# Patient Record
Sex: FEMALE | Race: WHITE | Employment: FULL TIME | URBAN - NONMETROPOLITAN AREA
[De-identification: names, ages, dates, MRNs, and addresses within clinical notes are randomized per-mention and may not be internally consistent; named-entity substitution may affect disease eponyms.]

---

## 2020-10-05 ENCOUNTER — HOSPITAL ENCOUNTER (EMERGENCY)
Age: 35
Discharge: HOME | End: 2020-10-05
Attending: EMERGENCY MEDICINE
Payer: MEDICAID

## 2020-10-05 ENCOUNTER — APPOINTMENT (OUTPATIENT)
Dept: GENERAL RADIOLOGY | Age: 35
End: 2020-10-05
Payer: MEDICAID

## 2020-10-05 VITALS
BODY MASS INDEX: 27.99 KG/M2 | OXYGEN SATURATION: 99 % | TEMPERATURE: 98.7 F | WEIGHT: 158 LBS | RESPIRATION RATE: 18 BRPM | HEART RATE: 116 BPM | SYSTOLIC BLOOD PRESSURE: 137 MMHG | DIASTOLIC BLOOD PRESSURE: 96 MMHG

## 2020-10-05 PROCEDURE — 73630 X-RAY EXAM OF FOOT: CPT

## 2020-10-05 PROCEDURE — 73610 X-RAY EXAM OF ANKLE: CPT

## 2020-10-05 PROCEDURE — 99283 EMERGENCY DEPT VISIT LOW MDM: CPT

## 2020-10-05 RX ORDER — METHYLPREDNISOLONE 4 MG/1
TABLET ORAL
Qty: 1 KIT | Refills: 0 | Status: SHIPPED | OUTPATIENT
Start: 2020-10-05 | End: 2020-12-08

## 2020-10-05 RX ORDER — OXCARBAZEPINE 300 MG/1
900 TABLET, FILM COATED ORAL DAILY
COMMUNITY
End: 2020-12-08 | Stop reason: SDUPTHER

## 2020-10-05 RX ORDER — NORTRIPTYLINE HYDROCHLORIDE 50 MG/1
25 CAPSULE ORAL NIGHTLY
COMMUNITY
End: 2020-12-08

## 2020-10-05 RX ORDER — HYDROXYZINE PAMOATE 50 MG/1
50 CAPSULE ORAL 3 TIMES DAILY PRN
COMMUNITY
End: 2020-12-08 | Stop reason: SDUPTHER

## 2020-10-05 RX ORDER — CEFDINIR 300 MG/1
300 CAPSULE ORAL 2 TIMES DAILY
Qty: 20 CAPSULE | Refills: 0 | Status: SHIPPED | OUTPATIENT
Start: 2020-10-05 | End: 2020-10-09

## 2020-10-05 ASSESSMENT — ENCOUNTER SYMPTOMS
NAUSEA: 0
SORE THROAT: 0
EYE DISCHARGE: 0
DIARRHEA: 0
ABDOMINAL PAIN: 0
BACK PAIN: 0
VOMITING: 0
COUGH: 0

## 2020-10-05 ASSESSMENT — PAIN DESCRIPTION - DESCRIPTORS: DESCRIPTORS: ACHING;CONSTANT

## 2020-10-05 ASSESSMENT — PAIN DESCRIPTION - FREQUENCY: FREQUENCY: CONTINUOUS

## 2020-10-05 ASSESSMENT — PAIN DESCRIPTION - PAIN TYPE: TYPE: ACUTE PAIN

## 2020-10-05 ASSESSMENT — PAIN DESCRIPTION - LOCATION: LOCATION: ANKLE;FOOT

## 2020-10-05 ASSESSMENT — PAIN DESCRIPTION - ORIENTATION: ORIENTATION: RIGHT

## 2020-10-05 ASSESSMENT — PAIN DESCRIPTION - PROGRESSION: CLINICAL_PROGRESSION: NOT CHANGED

## 2020-10-05 ASSESSMENT — PAIN SCALES - GENERAL
PAINLEVEL_OUTOF10: 7
PAINLEVEL_OUTOF10: 6

## 2020-10-05 NOTE — ED TRIAGE NOTES
Pt states that she fell two days ago. She has swelling to right foot and redness. Pt has pain to posterior foot and ankle. Edema is pitting. Home motrin not relieving pain. Breakdown to right great toe noted. States that she dropped a can on her foot.

## 2020-10-05 NOTE — ED PROVIDER NOTES
Positive for arthralgias and joint swelling. Negative for back pain and myalgias. Skin: Positive for wound. Negative for rash. Neurological: Negative for weakness and headaches. Hematological: Does not bruise/bleed easily. Psychiatric/Behavioral: Negative for confusion. Positives and Pertinent negatives as per HPI. Except as noted above in the ROS, all other systems were reviewed and negative. PAST MEDICAL HISTORY     Past Medical History:   Diagnosis Date    Hepatitis C          SURGICAL HISTORY     Past Surgical History:   Procedure Laterality Date    TUBAL LIGATION           CURRENTMEDICATIONS       Discharge Medication List as of 10/5/2020  4:11 PM      CONTINUE these medications which have NOT CHANGED    Details   hydrOXYzine (VISTARIL) 50 MG capsule Take 50 mg by mouth 3 times daily as needed for ItchingHistorical Med      OXcarbazepine (TRILEPTAL) 300 MG tablet Take 900 mg by mouth dailyHistorical Med      nortriptyline (PAMELOR) 25 MG capsule Take 25 mg by mouth nightlyHistorical Med               ALLERGIES     Latex; Adhesive tape; and Zithromax [azithromycin]    FAMILYHISTORY     History reviewed. No pertinent family history.        SOCIAL HISTORY       Social History     Socioeconomic History    Marital status: Single     Spouse name: None    Number of children: None    Years of education: None    Highest education level: None   Occupational History    None   Social Needs    Financial resource strain: None    Food insecurity     Worry: None     Inability: None    Transportation needs     Medical: None     Non-medical: None   Tobacco Use    Smoking status: Current Every Day Smoker     Packs/day: 0.50     Years: 10.00     Pack years: 5.00     Types: Cigarettes    Smokeless tobacco: Never Used   Substance and Sexual Activity    Alcohol use: No    Drug use: No    Sexual activity: Yes     Partners: Male   Lifestyle    Physical activity     Days per week: None     Minutes per session: None    Stress: None   Relationships    Social connections     Talks on phone: None     Gets together: None     Attends Restorationist service: None     Active member of club or organization: None     Attends meetings of clubs or organizations: None     Relationship status: None    Intimate partner violence     Fear of current or ex partner: None     Emotionally abused: None     Physically abused: None     Forced sexual activity: None   Other Topics Concern    None   Social History Narrative    None       SCREENINGS             PHYSICAL EXAM    (up to 7 for level 4, 8 or more for level 5)     ED Triage Vitals [10/05/20 1448]   BP Temp Temp Source Pulse Resp SpO2 Height Weight   -- 98.7 °F (37.1 °C) Oral 125 18 -- -- 158 lb (71.7 kg)       Physical Exam  Vitals signs and nursing note reviewed. Constitutional:       General: She is not in acute distress. Appearance: She is well-developed. Comments: Appears anxious. HENT:      Head: Normocephalic and atraumatic. Right Ear: External ear normal.      Left Ear: External ear normal.      Nose: Nose normal.      Mouth/Throat:      Pharynx: No oropharyngeal exudate. Eyes:      Conjunctiva/sclera: Conjunctivae normal.      Pupils: Pupils are equal, round, and reactive to light. Neck:      Musculoskeletal: Normal range of motion and neck supple. Cardiovascular:      Rate and Rhythm: Regular rhythm. Tachycardia present. Heart sounds: Normal heart sounds. No murmur. No friction rub. No gallop. Pulmonary:      Effort: Pulmonary effort is normal. No respiratory distress. Breath sounds: Normal breath sounds. No wheezing. Abdominal:      General: Bowel sounds are normal. There is no distension. Palpations: Abdomen is soft. Tenderness: There is no abdominal tenderness. There is no guarding or rebound.    Musculoskeletal:      Right hip: Normal.      Right knee: Normal.      Right ankle: She exhibits decreased range of motion, swelling and ecchymosis. She exhibits no deformity, no laceration and normal pulse. Tenderness. Lateral malleolus, AITFL and CF ligament tenderness found. No medial malleolus, no posterior TFL, no head of 5th metatarsal and no proximal fibula tenderness found. Achilles tendon exhibits pain. Achilles tendon exhibits no defect and normal Landeros's test results. Lumbar back: She exhibits tenderness and pain. She exhibits normal range of motion, no bony tenderness, no swelling, no edema, no deformity, no laceration and no spasm. Back:         Feet:    Lymphadenopathy:      Cervical: No cervical adenopathy. Skin:     General: Skin is warm and dry. Findings: No rash. Neurological:      Mental Status: She is alert and oriented to person, place, and time. Cranial Nerves: No cranial nerve deficit. DIAGNOSTIC RESULTS   LABS:    No results found for this visit on 10/05/20. All other labs were within normal range ornot returned as of this dictation. EKG: All EKG's are interpreted by the Emergency Department Physician who either signs or Co-signs this chart in the absence of a cardiologist.  Please see their note for interpretation of EKG. RADIOLOGY:   Non-plain film images such as CT, Ultrasound and MRI are read by the radiologist.  Plain radiographic images are visualized and preliminarily interpreted by the ED Provider with the belowfindings:    Interpretation per the Radiologist below, if available at the time of this note:    XR FOOT RIGHT (MIN 3 VIEWS)   Final Result   1. Acute minimally displaced oblique intra-articular fracture of the distal   right fibula with overlying soft tissue swelling. 2. No acute fracture or dislocation of the right foot. XR ANKLE RIGHT (MIN 3 VIEWS)   Final Result   1. Acute minimally displaced oblique intra-articular fracture of the distal   right fibula with overlying soft tissue swelling.    2. No acute fracture or dislocation of the right

## 2020-10-05 NOTE — DISCHARGE INSTR - COC
Continuity of Care Form    Patient Name: Phoebe Machuca   :  1985  MRN:  [id number]    Admit date:  10/5/2020  Discharge date:  ***    Code Status Order: No Order   Advance Directives:     Admitting Physician:  No admitting provider for patient encounter. PCP: No primary care provider on file. Discharging Nurse: [facility] Unit/Room#:   Discharging Unit Phone Number: ***    Emergency Contact:   Extended Emergency Contact Information  Primary Emergency Contact: Gume Abdelrahman [address], [facility] Phone: [phone]  Work Phone: [phone]  Mobile Phone: [phone]  Relation: Other    Past Surgical History:  Past Surgical History:   Procedure Laterality Date    TUBAL LIGATION         Immunization History: There is no immunization history on file for this patient. Active Problems: There is no problem list on file for this patient.       Isolation/Infection:   Isolation          No Isolation        Patient Infection Status     None to display          Nurse Assessment:  Last Vital Signs: BP (!) 137/96   Pulse 116   Temp 98.7 °F (37.1 °C) (Oral)   Resp 18   Wt 158 lb (71.7 kg)   SpO2 99%   BMI 27.99 kg/m²     Last documented pain score (0-10 scale): Pain Level: 7  Last Weight:   Wt Readings from Last 1 Encounters:   10/05/20 158 lb (71.7 kg)     Mental Status:  {IP PT MENTAL STATUS:62562}    IV Access:  { EDUARD IV ACCESS:810162169}    Nursing Mobility/ADLs:  Walking   {[facility] DME KVZN:167650785}  Transfer  {[facility] DME SHSP:472467967}  Bathing  {[facility] DME NCWR:071198993}  Dressing  {P DME [facility]:915431026}  Toileting  {[facility] DME GATX:435952938}  Feeding  {[facility] DME MWBR:657264975}  Med Admin  {[facility] DME OSGB:661506243}  Med Delivery   { EDUARD MED Delivery:326731731}    Wound Care Documentation and Therapy:        Elimination:  Continence:   · Bowel: {YES / ZH:83618}  · Bladder: {YES / NK:26596}  Urinary Catheter: {Urinary Catheter:872978134}   Colostomy/Ileostomy/Ileal Conduit: {YES / WO:07440} Date of Last BM: ***  No intake or output data in the 24 hours ending 10/05/20 1622  No intake/output data recorded.     Safety Concerns:     [address] [facility] Safety Concerns:119795329}    Impairments/Disabilities:      [address] [facility] Impairments/Disabilities:179448512}    Nutrition Therapy:  Current Nutrition Therapy:   [address] [place] [address] Diet List:599911312}    Routes of Feeding: {CHP DME Other Feedings:486981382}  Liquids: {Slp liquid thickness:49579}  Daily Fluid Restriction: {CHP DME Yes amt example:053992052}  Last Modified Barium Swallow with Video (Video Swallowing Test): {Done Not Done XOQS:442181489}    Treatments at the Time of Hospital Discharge:   Respiratory Treatments: ***  Oxygen Therapy:  {Therapy; copd oxygen:81184}  Ventilator:    {MH CC Vent YMDQ:566816736}    Rehab Therapies: {THERAPEUTIC INTERVENTION:9808546633}  Weight Bearing Status/Restrictions: [address] [place] [address] Weight Bearin}  Other Medical Equipment (for information only, NOT a DME order):  {EQUIPMENT:662604356}  Other Treatments: ***    Patient's personal belongings (please select all that are sent with patient):  {[facility] DME Belongings:065904601}    RN SIGNATURE:  {Esignature:411127404}    CASE MANAGEMENT/SOCIAL WORK SECTION    Inpatient Status Date: ***    Readmission Risk Assessment Score:  Readmission Risk              Risk of Unplanned Readmission:        0           Discharging to Facility/ Agency   · Name:   · Address:  · Phone:  · Fax:    Dialysis Facility (if applicable)   · Name:  · Address:  · Dialysis Schedule:  · Phone:  · Fax:    / signature: {Esignature:600839828}    PHYSICIAN SECTION    Prognosis: {Prognosis:9233073093}    Condition at Discharge: [address] Patient Condition:244745041}    Rehab Potential (if transferring to Rehab): {Prognosis:8809273294}    Recommended Labs or Other Treatments After Discharge: ***    Physician Certification: I certify the above information and transfer of Dandy Cherry  is necessary for the continuing treatment of the diagnosis listed and that she requires {Admit to Appropriate Level of Care:11569} for {GREATER/LESS:165538443} 30 days.      Update Admission H&P: {CHP DME Changes in [facility]:037828572}    PHYSICIAN SIGNATURE:  {Esignature:764542134}

## 2020-10-07 PROBLEM — S82.831A CLOSED FRACTURE OF RIGHT DISTAL FIBULA: Status: ACTIVE | Noted: 2020-10-07

## 2020-10-08 ENCOUNTER — TELEPHONE (OUTPATIENT)
Dept: ORTHOPEDIC SURGERY | Age: 35
End: 2020-10-08

## 2020-10-08 ENCOUNTER — HOSPITAL ENCOUNTER (EMERGENCY)
Age: 35
Discharge: HOME | End: 2020-10-08
Payer: MEDICAID

## 2020-10-08 NOTE — ED PROVIDER NOTES
Patient left and eloped with an AMA prior to being seen by me so there is no chance for a physical examination or note.       Dai Fountain MD  10/08/20 8484

## 2020-10-09 ENCOUNTER — OFFICE VISIT (OUTPATIENT)
Dept: ORTHOPEDIC SURGERY | Age: 35
End: 2020-10-09
Payer: MEDICAID

## 2020-10-09 VITALS — WEIGHT: 158.07 LBS | HEIGHT: 63 IN | BODY MASS INDEX: 28.01 KG/M2

## 2020-10-09 PROCEDURE — G9016 DEMO-SMOKING CESSATION COUN: HCPCS | Performed by: ORTHOPAEDIC SURGERY

## 2020-10-09 PROCEDURE — 4004F PT TOBACCO SCREEN RCVD TLK: CPT | Performed by: ORTHOPAEDIC SURGERY

## 2020-10-09 PROCEDURE — G8484 FLU IMMUNIZE NO ADMIN: HCPCS | Performed by: ORTHOPAEDIC SURGERY

## 2020-10-09 PROCEDURE — 99203 OFFICE O/P NEW LOW 30 MIN: CPT | Performed by: ORTHOPAEDIC SURGERY

## 2020-10-09 PROCEDURE — G8427 DOCREV CUR MEDS BY ELIG CLIN: HCPCS | Performed by: ORTHOPAEDIC SURGERY

## 2020-10-09 PROCEDURE — G8419 CALC BMI OUT NRM PARAM NOF/U: HCPCS | Performed by: ORTHOPAEDIC SURGERY

## 2020-10-09 NOTE — PROGRESS NOTES
Marielaemily Matthew    Age 28 y.o.    female    1985    MRN [id number]    10/12/2020  Arrival Time_____________  OR Time____________[address]     Procedure(s):  OPEN REDUCTION INTERNAL FIXATION RIGHT FIBULA FRACTURE, POSSIBLE SYNDESMOTIC LIGAMENT REPAIR -POSSIBLE BLOCK-                      General    Surgeon(s):  Ruddy Abdi, MD       Phone [phone] ([place])     [address] [place] [address] House [facility]  Cell Work  _________________________________________________________________  _________________________________________________________________  _________________________________________________________________  _________________________________________________________________  _________________________________________________________________      PCP _____________________________ Phone_________________       H&P__________________Bringing    Chart            Epic  DOS     Called_______  EKG__________________Bringing    Chart            Epic  DOS     Called_______  LAB__________________ Bringing    Chart            Epic  DOS     Called_______  Cardiac Clearance_______Bringing    Chart            Epic      DOS       Called_______    Cardiologist________________________ Phone___________________________      ? Anglican concerns / Waiver on Chart            PAT Communications_____________  ? Pre-op Instructions Given [place]          ______________________________  ? Directions to Surgery Center                          ______________________________  ? Transportation Home_______________      _______________________________  ?  Crutches/Walker__________________        _______________________________      ________Pre-op Orders   _______Transcribed    _______Wt.  ________Pharmacy          _______SCD  ______VTE     ______Beta Blocker  ________Consent             ________TED [place]

## 2020-10-09 NOTE — PROGRESS NOTES
Obstructive Sleep Apnea (KAZ) Screening     Patient:  Conrado Pathak    YOB: 1985      Medical Record #:  [id number]                     Date:  10/9/2020     1. Are you a loud and/or regular snorer? []  Yes       [x] No    2. Have you been observed to gasp or stop breathing during sleep? []  Yes       [x] No    3. Do you feel tired or groggy upon awakening or do you awaken with a headache?           []  Yes       [] No    4. Are you often tired or fatigued during the wake time hours? []  Yes       [] No    5. Do you fall asleep sitting, reading, watching TV or driving? []  Yes       [] No    6. Do you often have problems with memory or concentration? []  Yes       [] No    **If patient's score is ? 3 they are considered high risk for KAZ. An Anesthesia provider will evaluate the patient and develop a plan of care the day of surgery. Note:  If the patient's BMI is more than 35 kg m¯² , has neck circumference > 40 cm, and/or high blood pressure the risk is greater (© American Sleep Apnea Association, 2006).

## 2020-10-12 ENCOUNTER — HOSPITAL ENCOUNTER (OUTPATIENT)
Age: 35
Setting detail: OUTPATIENT SURGERY
Discharge: HOME | End: 2020-10-12
Attending: ORTHOPAEDIC SURGERY | Admitting: ORTHOPAEDIC SURGERY
Payer: MEDICAID

## 2020-10-12 ENCOUNTER — HOSPITAL ENCOUNTER (OUTPATIENT)
Dept: GENERAL RADIOLOGY | Age: 35
Discharge: HOME | End: 2020-10-12
Payer: MEDICAID

## 2020-10-12 VITALS
BODY MASS INDEX: 26.46 KG/M2 | OXYGEN SATURATION: 97 % | RESPIRATION RATE: 16 BRPM | HEIGHT: 64 IN | WEIGHT: 155 LBS | HEART RATE: 106 BPM | TEMPERATURE: 96.9 F | SYSTOLIC BLOOD PRESSURE: 121 MMHG | DIASTOLIC BLOOD PRESSURE: 81 MMHG

## 2020-10-12 LAB
PREGNANCY, URINE: NEGATIVE
SARS-COV-2, NAAT: NOT DETECTED

## 2020-10-12 PROCEDURE — U0002 COVID-19 LAB TEST NON-CDC: HCPCS

## 2020-10-12 PROCEDURE — 84703 CHORIONIC GONADOTROPIN ASSAY: CPT

## 2020-10-12 RX ORDER — SODIUM CHLORIDE 0.9 % (FLUSH) 0.9 %
10 SYRINGE (ML) INJECTION EVERY 12 HOURS SCHEDULED
Status: DISCONTINUED | OUTPATIENT
Start: 2020-10-12 | End: 2020-10-12 | Stop reason: HOSPADM

## 2020-10-12 RX ORDER — SODIUM CHLORIDE 0.9 % (FLUSH) 0.9 %
10 SYRINGE (ML) INJECTION PRN
Status: DISCONTINUED | OUTPATIENT
Start: 2020-10-12 | End: 2020-10-12 | Stop reason: HOSPADM

## 2020-10-12 RX ORDER — ZOLPIDEM TARTRATE 10 MG/1
10 TABLET ORAL NIGHTLY PRN
Qty: 15 TABLET | Refills: 0 | Status: SHIPPED | OUTPATIENT
Start: 2020-10-12 | End: 2020-10-23

## 2020-10-12 RX ORDER — LIDOCAINE HYDROCHLORIDE 10 MG/ML
1 INJECTION, SOLUTION EPIDURAL; INFILTRATION; INTRACAUDAL; PERINEURAL
Status: DISCONTINUED | OUTPATIENT
Start: 2020-10-12 | End: 2020-10-12 | Stop reason: HOSPADM

## 2020-10-12 RX ORDER — OXYCODONE HYDROCHLORIDE AND ACETAMINOPHEN 5; 325 MG/1; MG/1
1 TABLET ORAL EVERY 6 HOURS PRN
Qty: 28 TABLET | Refills: 0 | Status: SHIPPED | OUTPATIENT
Start: 2020-10-12 | End: 2020-10-19

## 2020-10-12 RX ORDER — APREPITANT 40 MG/1
40 CAPSULE ORAL ONCE
Status: DISCONTINUED | OUTPATIENT
Start: 2020-10-12 | End: 2020-10-12 | Stop reason: HOSPADM

## 2020-10-12 RX ORDER — CEPHALEXIN 500 MG/1
500 CAPSULE ORAL 4 TIMES DAILY
Qty: 12 CAPSULE | Refills: 0 | Status: SHIPPED | OUTPATIENT
Start: 2020-10-12 | End: 2020-10-23

## 2020-10-12 RX ORDER — SODIUM CHLORIDE, SODIUM LACTATE, POTASSIUM CHLORIDE, CALCIUM CHLORIDE 600; 310; 30; 20 MG/100ML; MG/100ML; MG/100ML; MG/100ML
INJECTION, SOLUTION INTRAVENOUS CONTINUOUS
Status: DISCONTINUED | OUTPATIENT
Start: 2020-10-12 | End: 2020-10-12 | Stop reason: HOSPADM

## 2020-10-12 NOTE — PROGRESS NOTES
Pt does not have H& P done, Dr. Riaz Norris informed, case is getting cancelled today so pt can get an H&P. Explained everything to pt, Dr. Micah Saha office is going to contact pt to help her get an H&P. Pt tearful. Call placed to [facility] in [place]. [place] - they have her prescriptions there as pt was saying they did not have them there. Pt informed, understanding verbalized.

## 2020-10-12 NOTE — PROGRESS NOTES
Chief Complaint    New Patient (right fibula fx)      History of Present Illness:  Lyudmila Newton is a 28 y.o. femalehere for evaluation chief complaint of right ankle pain. She states that on 10/3/2020 she slipped at home on the floor twisting her right ankle. She was found to have a right distal fibula fracture as a night isolated injury. Currently rates her pain at 8 out of 10. She was put in a boot at the hospital and states ice and rest make it better any motion makes it worse. 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 Medical History:  Patient's medications, allergies, past medical, surgical, social and family histories were reviewed and updated as appropriate. Review of Systems:  Pertinent items are noted in HPI  Review of systems reviewed from Patient History Form dated on 10/9/2020 and available in the patient's chart under the Media tab. Vital Signs:  Ht 5' 2.99\" (1.6 m)   Wt 158 lb 1.1 oz (71.7 kg)   LMP 10/03/2020   BMI 28.01 kg/m²     General Exam:   Constitutional: Patient is adequately groomed with no evidence of malnutrition  DTRs: Deep tendon reflexes are intact  Mental Status: The patient is oriented to time, place and person. The patient's mood and affect are appropriate. Lymphatic: The lymphatic examination bilaterally reveals all areas to be without enlargement or induration. Foot Examination:    Inspection: Moderate swelling and ecchymosis right ankle    Palpation: Tenderness over the lateral aspect of the ankle    Range of Motion: Within normal limits at the hip and knee    Strength: She is able to wiggle her toes and has at least 4/5 strength in the hip and knee    Special Tests: No evidence of DVT or compartment syndrome    Skin: There are no rashes, ulcerations or lesions.     Gait: Antalgic    Reflex 2+ and symmetric    Additional Comments:       Additional Examinations:         Left Lower Extremity: Examination of the left lower extremity does not show any tenderness, deformity or injury. Range of motion is unremarkable. There is no gross instability. There are no rashes, ulcerations or lesions. Strength and tone are normal.    Radiology:     X-rays obtained and reviewed in office:  Views 3  Location right ankle  Impression obtained previously shows evidence of a displaced right distal fibula fracture          Assessment : Right distal fibula fracture    Impression:  Encounter Diagnosis   Name Primary?  Preop testing Yes       Office Procedures:  Orders Placed This Encounter   Procedures    COVID-19 Ambulatory     Sx 10/13/20     Standing Status:   Future     Standing Expiration Date:   10/9/2021     Scheduling Instructions:      Saline media preferred given current shortage of viral transport media but both acceptable     Order Specific Question:   Is this test for diagnosis or screening? Answer:   Screening     Order Specific Question:   Symptomatic for COVID-19 as defined by CDC? Answer:   No     Order Specific Question:   Date of Symptom Onset     Answer:   N/A     Order Specific Question:   Hospitalized for COVID-19? Answer:   No     Order Specific Question:   Admitted to ICU for COVID-19? Answer:   No     Order Specific Question:   Employed in healthcare setting? Answer:   No     Order Specific Question:   Resident in a congregate (group) care setting? Answer:   No     Order Specific Question:   Pregnant? Answer:   No     Order Specific Question:   Previously tested for COVID-19? Answer:   Yes        Treatment Plan:  The etiology of distal fibula fracture and it's appropriate treatment were discussed in great detail. We discussed operative and nonoperative treatments. She is a smoker and smoking cessation was discussed. We covered her leg with Polysporin and put her in a Senior dressing and I gave her Percocet and Keflex she will follow-up postop.   She also requested medicine to help her sleep and I gave her Ambien we discussed the side effects of all of these medicines

## 2020-10-14 ENCOUNTER — ANESTHESIA EVENT (OUTPATIENT)
Dept: OPERATING ROOM | Age: 35
End: 2020-10-14
Payer: MEDICAID

## 2020-10-14 ENCOUNTER — TELEPHONE (OUTPATIENT)
Dept: PRIMARY CARE CLINIC | Age: 35
End: 2020-10-14

## 2020-10-14 ASSESSMENT — LIFESTYLE VARIABLES: SMOKING_STATUS: 1

## 2020-10-14 NOTE — TELEPHONE ENCOUNTER
----- Message from Jenniferdiontedanielito Collins sent at 9/28/2020 12:06 PM EDT -----  Subject: Appointment Request    Reason for Call: New Patient Request Appointment    QUESTIONS  Type of Appointment? New Patient/New to Provider  Reason for appointment request? No appointments available during search  Additional Information for Provider? Pt is requesting to establish   Ron Jerome as PCP   tested negative for COVID in the past 10 days   requesting 10/19 afternooon if possible  ---------------------------------------------------------------------------  --------------  CALL BACK INFO  What is the best way for the office to contact you? OK to leave message on   voicemail  Preferred Call Back Phone Number? [phone]  ---------------------------------------------------------------------------  --------------  SCRIPT ANSWERS  Relationship to Patient? Other  Representative Name? Ashley Moore  Additional information verified (besides Name and Date of Birth)? Address  Appointment reason? Establish Care/Find a provider  Have you been diagnosed with   tested for   or told that you are suspected of having COVID-19 (Coronavirus)? Yes  Did your symptoms begin or have you been tested for COVID-19 in the last   10 days?  Yes

## 2020-10-14 NOTE — ANESTHESIA PRE PROCEDURE
(Current):    BP: 104/62  Pulse: 112    Resp: 18  SpO2: 96    Temp: 98.6 °F (37 °C)    Height: 5' 4\" (1.626 m)  (10/15/20)  Weight: 155 lb (70.3 kg)  (10/15/20)    BMI: 26.7            BP Readings from Last 3 Encounters:   10/12/20 121/81   10/05/20 (!) 137/96     NPO Status: >8 hrs                         BMI:   Wt Readings from Last 3 Encounters:   10/14/20 155 lb (70.3 kg)   10/09/20 155 lb (70.3 kg)   10/09/20 158 lb 1.1 oz (71.7 kg)     Body mass index is 26.61 kg/m². HCG (If Applicable): negative    COVID-19 Screening (If Applicable):   Lab Results   Component Value Date    COVID19 Not Detected 10/12/2020     Anesthesia Evaluation  Patient summary reviewed and Nursing notes reviewed  Airway: Mallampati: II  TM distance: >3 FB   Neck ROM: full  Mouth opening: > = 3 FB Dental:          Pulmonary: breath sounds clear to auscultation  (+) current smoker    (-) asthma and wheezes                           Cardiovascular:  Exercise tolerance: good (>4 METS),       (-) past MI,  angina,  GILMAN and murmur      Rhythm: regular  Rate: normal                    Neuro/Psych:   (+) psychiatric history:   (-) seizures            ROS comment: +Bipolar D/O    H/O Bipolar D/O- last used IV drugs 72 hrs ago- took PO Percocet, Vistaril and Ambien prior to arrival today GI/Hepatic/Renal:   (+) hepatitis: C, liver disease:,      (-) no renal disease       Endo/Other:    (+) : arthritis:., .    (-) diabetes mellitus, hypothyroidism               Abdominal:           Vascular:     - DVT and PE. Anesthesia Plan      general     ASA 3     (PF and AC Block(s) for post-op pain management per surgeon request.)  Induction: intravenous. MIPS: Prophylactic antiemetics administered. Anesthetic plan and risks discussed with patient. Plan discussed with CRNA.             Navya Chaves MD

## 2020-10-15 ENCOUNTER — HOSPITAL ENCOUNTER (OUTPATIENT)
Age: 35
Setting detail: OUTPATIENT SURGERY
Discharge: HOME | End: 2020-10-15
Attending: ORTHOPAEDIC SURGERY | Admitting: ORTHOPAEDIC SURGERY
Payer: MEDICAID

## 2020-10-15 ENCOUNTER — ANESTHESIA (OUTPATIENT)
Dept: OPERATING ROOM | Age: 35
End: 2020-10-15
Payer: MEDICAID

## 2020-10-15 VITALS
SYSTOLIC BLOOD PRESSURE: 118 MMHG | OXYGEN SATURATION: 100 % | TEMPERATURE: 98.6 F | DIASTOLIC BLOOD PRESSURE: 55 MMHG | RESPIRATION RATE: 14 BRPM

## 2020-10-15 VITALS
RESPIRATION RATE: 15 BRPM | SYSTOLIC BLOOD PRESSURE: 131 MMHG | DIASTOLIC BLOOD PRESSURE: 85 MMHG | BODY MASS INDEX: 26.46 KG/M2 | TEMPERATURE: 97.7 F | OXYGEN SATURATION: 99 % | WEIGHT: 155 LBS | HEART RATE: 110 BPM | HEIGHT: 64 IN

## 2020-10-15 LAB — PREGNANCY, URINE: NEGATIVE

## 2020-10-15 PROCEDURE — 3700000000 HC ANESTHESIA ATTENDED CARE: Performed by: ORTHOPAEDIC SURGERY

## 2020-10-15 PROCEDURE — 2500000003 HC RX 250 WO HCPCS: Performed by: ORTHOPAEDIC SURGERY

## 2020-10-15 PROCEDURE — 2580000003 HC RX 258: Performed by: ANESTHESIOLOGY

## 2020-10-15 PROCEDURE — 6360000002 HC RX W HCPCS: Performed by: NURSE ANESTHETIST, CERTIFIED REGISTERED

## 2020-10-15 PROCEDURE — 6360000002 HC RX W HCPCS: Performed by: ORTHOPAEDIC SURGERY

## 2020-10-15 PROCEDURE — 84703 CHORIONIC GONADOTROPIN ASSAY: CPT

## 2020-10-15 PROCEDURE — 3600000004 HC SURGERY LEVEL 4 BASE: Performed by: ORTHOPAEDIC SURGERY

## 2020-10-15 PROCEDURE — 3600000014 HC SURGERY LEVEL 4 ADDTL 15MIN: Performed by: ORTHOPAEDIC SURGERY

## 2020-10-15 PROCEDURE — 7100000001 HC PACU RECOVERY - ADDTL 15 MIN: Performed by: ORTHOPAEDIC SURGERY

## 2020-10-15 PROCEDURE — C1713 ANCHOR/SCREW BN/BN,TIS/BN: HCPCS | Performed by: ORTHOPAEDIC SURGERY

## 2020-10-15 PROCEDURE — 76942 ECHO GUIDE FOR BIOPSY: CPT | Performed by: ANESTHESIOLOGY

## 2020-10-15 PROCEDURE — 7100000010 HC PHASE II RECOVERY - FIRST 15 MIN: Performed by: ORTHOPAEDIC SURGERY

## 2020-10-15 PROCEDURE — 64447 NJX AA&/STRD FEMORAL NRV IMG: CPT | Performed by: ANESTHESIOLOGY

## 2020-10-15 PROCEDURE — C1769 GUIDE WIRE: HCPCS | Performed by: ORTHOPAEDIC SURGERY

## 2020-10-15 PROCEDURE — 2580000003 HC RX 258: Performed by: ORTHOPAEDIC SURGERY

## 2020-10-15 PROCEDURE — 7100000011 HC PHASE II RECOVERY - ADDTL 15 MIN: Performed by: ORTHOPAEDIC SURGERY

## 2020-10-15 PROCEDURE — 2709999900 HC NON-CHARGEABLE SUPPLY: Performed by: ORTHOPAEDIC SURGERY

## 2020-10-15 PROCEDURE — 2500000003 HC RX 250 WO HCPCS: Performed by: ANESTHESIOLOGY

## 2020-10-15 PROCEDURE — 3700000001 HC ADD 15 MINUTES (ANESTHESIA): Performed by: ORTHOPAEDIC SURGERY

## 2020-10-15 PROCEDURE — 7100000000 HC PACU RECOVERY - FIRST 15 MIN: Performed by: ORTHOPAEDIC SURGERY

## 2020-10-15 PROCEDURE — 2500000003 HC RX 250 WO HCPCS: Performed by: NURSE ANESTHETIST, CERTIFIED REGISTERED

## 2020-10-15 DEVICE — SCREW BNE L22MM DIA3.5MM CORT FT ANK S STL NONLOCKING LO: Type: IMPLANTABLE DEVICE | Site: ANKLE | Status: FUNCTIONAL

## 2020-10-15 DEVICE — SCREW BNE L12MM DIA3.5MM CORT ANK S STL NONLOCKING LO PROF: Type: IMPLANTABLE DEVICE | Site: ANKLE | Status: FUNCTIONAL

## 2020-10-15 DEVICE — PLATE BNE 6 H R DST FIB S STL LOK FOR FRAC MGMT SYS: Type: IMPLANTABLE DEVICE | Site: ANKLE | Status: FUNCTIONAL

## 2020-10-15 DEVICE — SCREW BNE L14MM DIA3.5MM CORT ANK S STL LOK FULL THRD LO: Type: IMPLANTABLE DEVICE | Site: ANKLE | Status: FUNCTIONAL

## 2020-10-15 DEVICE — SCREW BNE L10MM DIA2.7MM ANK S STL LOK LO PROF FOR FRAC: Type: IMPLANTABLE DEVICE | Site: ANKLE | Status: FUNCTIONAL

## 2020-10-15 DEVICE — SCREW BNE L16MM DIA2.7MM ANK S STL LOK LO PROF FOR FRAC: Type: IMPLANTABLE DEVICE | Site: ANKLE | Status: FUNCTIONAL

## 2020-10-15 DEVICE — SCREW BNE L12MM DIA3.5MM CORT ANK S STL LOK FULL THRD LO: Type: IMPLANTABLE DEVICE | Site: ANKLE | Status: FUNCTIONAL

## 2020-10-15 RX ORDER — OXYCODONE HYDROCHLORIDE AND ACETAMINOPHEN 5; 325 MG/1; MG/1
1 TABLET ORAL PRN
Status: DISCONTINUED | OUTPATIENT
Start: 2020-10-15 | End: 2020-10-15 | Stop reason: HOSPADM

## 2020-10-15 RX ORDER — MIDAZOLAM HYDROCHLORIDE 1 MG/ML
INJECTION INTRAMUSCULAR; INTRAVENOUS PRN
Status: DISCONTINUED | OUTPATIENT
Start: 2020-10-15 | End: 2020-10-15 | Stop reason: SDUPTHER

## 2020-10-15 RX ORDER — OXYCODONE HYDROCHLORIDE AND ACETAMINOPHEN 5; 325 MG/1; MG/1
2 TABLET ORAL PRN
Status: DISCONTINUED | OUTPATIENT
Start: 2020-10-15 | End: 2020-10-15 | Stop reason: HOSPADM

## 2020-10-15 RX ORDER — SODIUM CHLORIDE 0.9 % (FLUSH) 0.9 %
10 SYRINGE (ML) INJECTION PRN
Status: DISCONTINUED | OUTPATIENT
Start: 2020-10-15 | End: 2020-10-15 | Stop reason: HOSPADM

## 2020-10-15 RX ORDER — PROPOFOL 10 MG/ML
INJECTION, EMULSION INTRAVENOUS PRN
Status: DISCONTINUED | OUTPATIENT
Start: 2020-10-15 | End: 2020-10-15 | Stop reason: SDUPTHER

## 2020-10-15 RX ORDER — VANCOMYCIN HYDROCHLORIDE 1 G/20ML
INJECTION, POWDER, LYOPHILIZED, FOR SOLUTION INTRAVENOUS PRN
Status: DISCONTINUED | OUTPATIENT
Start: 2020-10-15 | End: 2020-10-15 | Stop reason: ALTCHOICE

## 2020-10-15 RX ORDER — LIDOCAINE HYDROCHLORIDE 10 MG/ML
0.3 INJECTION, SOLUTION EPIDURAL; INFILTRATION; INTRACAUDAL; PERINEURAL
Status: DISCONTINUED | OUTPATIENT
Start: 2020-10-15 | End: 2020-10-15 | Stop reason: HOSPADM

## 2020-10-15 RX ORDER — LIDOCAINE HYDROCHLORIDE 20 MG/ML
INJECTION, SOLUTION EPIDURAL; INFILTRATION; INTRACAUDAL; PERINEURAL PRN
Status: DISCONTINUED | OUTPATIENT
Start: 2020-10-15 | End: 2020-10-15 | Stop reason: SDUPTHER

## 2020-10-15 RX ORDER — VANCOMYCIN HYDROCHLORIDE 500 MG/10ML
INJECTION, POWDER, LYOPHILIZED, FOR SOLUTION INTRAVENOUS
Status: DISCONTINUED
Start: 2020-10-15 | End: 2020-10-15 | Stop reason: HOSPADM

## 2020-10-15 RX ORDER — SODIUM CHLORIDE 0.9 % (FLUSH) 0.9 %
10 SYRINGE (ML) INJECTION EVERY 12 HOURS SCHEDULED
Status: DISCONTINUED | OUTPATIENT
Start: 2020-10-15 | End: 2020-10-15 | Stop reason: HOSPADM

## 2020-10-15 RX ORDER — MEPERIDINE HYDROCHLORIDE 50 MG/ML
12.5 INJECTION INTRAMUSCULAR; INTRAVENOUS; SUBCUTANEOUS EVERY 5 MIN PRN
Status: DISCONTINUED | OUTPATIENT
Start: 2020-10-15 | End: 2020-10-15 | Stop reason: HOSPADM

## 2020-10-15 RX ORDER — SODIUM CHLORIDE, SODIUM LACTATE, POTASSIUM CHLORIDE, CALCIUM CHLORIDE 600; 310; 30; 20 MG/100ML; MG/100ML; MG/100ML; MG/100ML
INJECTION, SOLUTION INTRAVENOUS CONTINUOUS
Status: DISCONTINUED | OUTPATIENT
Start: 2020-10-15 | End: 2020-10-15 | Stop reason: HOSPADM

## 2020-10-15 RX ORDER — BUPIVACAINE HYDROCHLORIDE AND EPINEPHRINE 5; 5 MG/ML; UG/ML
INJECTION, SOLUTION EPIDURAL; INTRACAUDAL; PERINEURAL PRN
Status: DISCONTINUED | OUTPATIENT
Start: 2020-10-15 | End: 2020-10-15 | Stop reason: SDUPTHER

## 2020-10-15 RX ORDER — MAGNESIUM HYDROXIDE 1200 MG/15ML
LIQUID ORAL CONTINUOUS PRN
Status: COMPLETED | OUTPATIENT
Start: 2020-10-15 | End: 2020-10-15

## 2020-10-15 RX ORDER — ONDANSETRON 2 MG/ML
4 INJECTION INTRAMUSCULAR; INTRAVENOUS
Status: DISCONTINUED | OUTPATIENT
Start: 2020-10-15 | End: 2020-10-15 | Stop reason: HOSPADM

## 2020-10-15 RX ORDER — HYDRALAZINE HYDROCHLORIDE 20 MG/ML
5 INJECTION INTRAMUSCULAR; INTRAVENOUS EVERY 10 MIN PRN
Status: DISCONTINUED | OUTPATIENT
Start: 2020-10-15 | End: 2020-10-15 | Stop reason: HOSPADM

## 2020-10-15 RX ORDER — FENTANYL CITRATE 50 UG/ML
25 INJECTION, SOLUTION INTRAMUSCULAR; INTRAVENOUS EVERY 5 MIN PRN
Status: DISCONTINUED | OUTPATIENT
Start: 2020-10-15 | End: 2020-10-15 | Stop reason: HOSPADM

## 2020-10-15 RX ORDER — DEXAMETHASONE SODIUM PHOSPHATE 10 MG/ML
INJECTION INTRAMUSCULAR; INTRAVENOUS PRN
Status: DISCONTINUED | OUTPATIENT
Start: 2020-10-15 | End: 2020-10-15 | Stop reason: SDUPTHER

## 2020-10-15 RX ORDER — ONDANSETRON 2 MG/ML
INJECTION INTRAMUSCULAR; INTRAVENOUS PRN
Status: DISCONTINUED | OUTPATIENT
Start: 2020-10-15 | End: 2020-10-15 | Stop reason: SDUPTHER

## 2020-10-15 RX ORDER — PROMETHAZINE HYDROCHLORIDE 25 MG/ML
6.25 INJECTION, SOLUTION INTRAMUSCULAR; INTRAVENOUS
Status: DISCONTINUED | OUTPATIENT
Start: 2020-10-15 | End: 2020-10-15 | Stop reason: HOSPADM

## 2020-10-15 RX ORDER — FENTANYL CITRATE 50 UG/ML
INJECTION, SOLUTION INTRAMUSCULAR; INTRAVENOUS PRN
Status: DISCONTINUED | OUTPATIENT
Start: 2020-10-15 | End: 2020-10-15 | Stop reason: SDUPTHER

## 2020-10-15 RX ADMIN — SODIUM CHLORIDE, POTASSIUM CHLORIDE, SODIUM LACTATE AND CALCIUM CHLORIDE: 600; 310; 30; 20 INJECTION, SOLUTION INTRAVENOUS at 12:40

## 2020-10-15 RX ADMIN — SODIUM CHLORIDE, POTASSIUM CHLORIDE, SODIUM LACTATE AND CALCIUM CHLORIDE: 600; 310; 30; 20 INJECTION, SOLUTION INTRAVENOUS at 13:31

## 2020-10-15 RX ADMIN — ONDANSETRON 4 MG: 2 INJECTION INTRAMUSCULAR; INTRAVENOUS at 14:35

## 2020-10-15 RX ADMIN — PROPOFOL 200 MG: 10 INJECTION, EMULSION INTRAVENOUS at 14:26

## 2020-10-15 RX ADMIN — BUPIVACAINE HYDROCHLORIDE AND EPINEPHRINE 10 ML: 5; 5 INJECTION, SOLUTION EPIDURAL; INTRACAUDAL; PERINEURAL at 13:47

## 2020-10-15 RX ADMIN — FENTANYL CITRATE 25 MCG: 50 INJECTION, SOLUTION INTRAMUSCULAR; INTRAVENOUS at 14:24

## 2020-10-15 RX ADMIN — SODIUM CHLORIDE, POTASSIUM CHLORIDE, SODIUM LACTATE AND CALCIUM CHLORIDE: 600; 310; 30; 20 INJECTION, SOLUTION INTRAVENOUS at 13:30

## 2020-10-15 RX ADMIN — Medication 900 MG: at 14:23

## 2020-10-15 RX ADMIN — SODIUM CHLORIDE, POTASSIUM CHLORIDE, SODIUM LACTATE AND CALCIUM CHLORIDE: 600; 310; 30; 20 INJECTION, SOLUTION INTRAVENOUS at 14:50

## 2020-10-15 RX ADMIN — BUPIVACAINE HYDROCHLORIDE AND EPINEPHRINE 10 ML: 5; 5 INJECTION, SOLUTION EPIDURAL; INTRACAUDAL; PERINEURAL at 13:49

## 2020-10-15 RX ADMIN — BUPIVACAINE HYDROCHLORIDE AND EPINEPHRINE 10 ML: 5; 5 INJECTION, SOLUTION EPIDURAL; INTRACAUDAL; PERINEURAL at 13:45

## 2020-10-15 RX ADMIN — FENTANYL CITRATE 25 MCG: 50 INJECTION, SOLUTION INTRAMUSCULAR; INTRAVENOUS at 14:26

## 2020-10-15 RX ADMIN — LIDOCAINE HYDROCHLORIDE 3 MG: 20 INJECTION, SOLUTION EPIDURAL; INFILTRATION; INTRACAUDAL; PERINEURAL at 14:26

## 2020-10-15 RX ADMIN — BUPIVACAINE HYDROCHLORIDE AND EPINEPHRINE 10 ML: 5; 5 INJECTION, SOLUTION EPIDURAL; INTRACAUDAL; PERINEURAL at 13:48

## 2020-10-15 RX ADMIN — MIDAZOLAM HYDROCHLORIDE 1 MG: 2 INJECTION, SOLUTION INTRAMUSCULAR; INTRAVENOUS at 14:22

## 2020-10-15 RX ADMIN — BUPIVACAINE HYDROCHLORIDE AND EPINEPHRINE 10 ML: 5; 5 INJECTION, SOLUTION EPIDURAL; INTRACAUDAL; PERINEURAL at 13:46

## 2020-10-15 RX ADMIN — DEXAMETHASONE SODIUM PHOSPHATE 10 MG: 10 INJECTION INTRAMUSCULAR; INTRAVENOUS at 14:35

## 2020-10-15 ASSESSMENT — PULMONARY FUNCTION TESTS
PIF_VALUE: 4
PIF_VALUE: 4
PIF_VALUE: 5
PIF_VALUE: 17
PIF_VALUE: 3
PIF_VALUE: 1
PIF_VALUE: 4
PIF_VALUE: 1
PIF_VALUE: 2
PIF_VALUE: 1
PIF_VALUE: 4
PIF_VALUE: 16
PIF_VALUE: 4
PIF_VALUE: 3
PIF_VALUE: 5
PIF_VALUE: 1
PIF_VALUE: 3
PIF_VALUE: 13
PIF_VALUE: 4
PIF_VALUE: 4
PIF_VALUE: 6
PIF_VALUE: 0
PIF_VALUE: 1
PIF_VALUE: 4
PIF_VALUE: 15
PIF_VALUE: 17
PIF_VALUE: 3
PIF_VALUE: 4
PIF_VALUE: 17
PIF_VALUE: 1
PIF_VALUE: 17
PIF_VALUE: 5
PIF_VALUE: 5
PIF_VALUE: 4
PIF_VALUE: 18
PIF_VALUE: 2
PIF_VALUE: 4
PIF_VALUE: 19
PIF_VALUE: 2
PIF_VALUE: 4
PIF_VALUE: 8
PIF_VALUE: 3
PIF_VALUE: 4
PIF_VALUE: 2
PIF_VALUE: 4
PIF_VALUE: 4
PIF_VALUE: 2
PIF_VALUE: 3
PIF_VALUE: 3
PIF_VALUE: 24
PIF_VALUE: 3
PIF_VALUE: 3
PIF_VALUE: 4
PIF_VALUE: 4
PIF_VALUE: 3
PIF_VALUE: 4
PIF_VALUE: 3
PIF_VALUE: 4
PIF_VALUE: 4
PIF_VALUE: 1
PIF_VALUE: 18
PIF_VALUE: 3
PIF_VALUE: 3
PIF_VALUE: 17
PIF_VALUE: 16
PIF_VALUE: 2
PIF_VALUE: 4
PIF_VALUE: 18
PIF_VALUE: 8

## 2020-10-15 ASSESSMENT — PAIN SCALES - GENERAL
PAINLEVEL_OUTOF10: 0

## 2020-10-15 ASSESSMENT — PAIN - FUNCTIONAL ASSESSMENT
PAIN_FUNCTIONAL_ASSESSMENT: RESPIRATORY RATE >10
PAIN_FUNCTIONAL_ASSESSMENT: 0-10

## 2020-10-15 NOTE — ANESTHESIA PROCEDURE NOTES
Peripheral Block    Patient location during procedure: pre-op  Start time: 10/15/2020 1:45 PM  End time: 10/15/2020 1:50 PM  Staffing  Anesthesiologist: Niesl Mixon MD  Performed: anesthesiologist   Preanesthetic Checklist  Completed: patient identified, site marked, surgical consent, pre-op evaluation, timeout performed, IV checked, risks and benefits discussed, monitors and equipment checked, anesthesia consent given, oxygen available and patient being monitored  Peripheral Block  Patient position: supine  Patient monitoring: continuous pulse ox and IV access  Block type: Sciatic  Laterality: right  Injection technique: single-shot  Procedures: ultrasound guided  Popliteal  Provider prep: sterile gloves and mask  Needle  Needle gauge: 21 G  Needle length: 10 cm  Needle localization: ultrasound guidance  Test dose: negative  Assessment  Injection assessment: negative aspiration for heme and local visualized surrounding nerve on ultrasound  Paresthesia pain: immediately resolved  Slow fractionated injection: yes  Hemodynamics: stable  Additional Notes  Pt. agrees to risks, benefits and alternatives to block  Block performed at the request of the surgeon for post-operative pain management   Immediately prior to procedure a \"time out\" was called to verify the correct patient, procedure, equipment, support staff. Site/side marked as required  Side: right  Site/Approach: lateral thigh 5-10 cm superior to the PF crease  Position: supine with leg elevated on blankets  Sedation: none  Local Anesthetic Dose:  Lido 2%    2 ml sc prior to each block  Aseptic technique: prepped with chlorhexidine  Ultrasound:   yes- see attached image   Local Anesthetic: 0.5% Bupivacaine with Epi 1:200K Amount:  30 ml in 5 ml increments after negative aspiration each time. Easy injection w/o resistance and w/o pain/paresthesias. Pt tolerated procedure well. No complications.   Reason for block: post-op pain management and at surgeon's request

## 2020-10-15 NOTE — PROGRESS NOTES
Pt sitting up eating  C/o left arm weakness - states she fell asleep on left arm  Today - she does admit to iv fentanyl use in this arm today scabs noted left antecubital area- dr Soliz Smoke was notified and will come valuate  Left hand  weaker than rt hand - other wise no complaints left foot /leg no pain

## 2020-10-15 NOTE — ANESTHESIA PROCEDURE NOTES
Peripheral Block    Patient location during procedure: pre-op  Start time: 10/15/2020 1:45 PM  End time: 10/15/2020 1:50 PM  Staffing  Anesthesiologist: Silvano Collet, MD  Performed: anesthesiologist   Preanesthetic Checklist  Completed: patient identified, site marked, surgical consent, pre-op evaluation, timeout performed, IV checked, risks and benefits discussed, monitors and equipment checked, anesthesia consent given, oxygen available and patient being monitored  Peripheral Block  Patient position: supine  Prep: ChloraPrep  Patient monitoring: continuous pulse ox and IV access  Block type: Femoral  Laterality: right  Injection technique: single-shot  Procedures: ultrasound guided  Adductor canal  Provider prep: sterile gloves and mask  Needle  Needle gauge: 21 G  Needle length: 10 cm  Needle localization: ultrasound guidance  Test dose: negative  Assessment  Injection assessment: negative aspiration for heme and local visualized surrounding nerve on ultrasound  Paresthesia pain: immediately resolved  Slow fractionated injection: yes  Hemodynamics: stable  Additional Notes  Pt. agrees to risks, benefits and alternatives to block  Block performed at the request of the surgeon for post-operative pain management   Immediately prior to procedure a \"time out\" was called to verify the correct patient, procedure, equipment, support staff. Site/side marked as required  Side: right  Site/Approach: anteromedial thigh approximately 10-15 cm from the inguinal crease. Position: supine  Sedation: none  Local Anesthetic Dose:  2% Lido  2 ml  Aseptic technique: prepped with chlorhexidine  Ultrasound:   yes, see attached image  Iliopsoas Muscle and Fascia Iliaca, Femoral artery (Deep artery to the thigh take off), Femoral Vein and Femoral Nerve are identified; the tip of the need and the spread of the local anesthetic around the Femoral nerve are visualized.  The Femoral nerve appeared to be anatomically normal and there were no abnormal pathologically findings seen. Local Anesthetic: 0.5% Bupivacaine with Epi 1:200K  Amount: 20 ml  in 5 ml increments after negative aspiration each time. Easy injection w/o resistance and w/o pain/paresthesias. Pt tolerated procedure well. No complications.   Reason for block: post-op pain management and at surgeon's request

## 2020-10-15 NOTE — PROGRESS NOTES
On arrival tp went to [facility] asked for wipes to clean up,freshen up. Was in BR for 15-20 minutes. GOt small urine sample, pt was standing  Up on rt  foot, slt unsteady. WHen asked if pt was using  Drugs she said she hadn't used in 72 hrs. But seemed confused what day it was. She thought she was just here the  Other  Day. She has multiple old and new red areas in arms, legs, 1 inch  Open  Area  To flank area. Bilat inner elbow areas  With red marks. PT also stated she took all her bedtime meds so she could sleep . SHe verb she thinks she will be here for 8 hours. HEr friend Margarita Pierce will come and get her. WHile admitting pt she had heavy eyes , dozing off, off and on. Dr. Ruth Hurd aware.  Dr. Roe Chatman present

## 2020-10-15 NOTE — ANESTHESIA POSTPROCEDURE EVALUATION
Department of Anesthesiology  Postprocedure Note    Patient: Viktoria Russo  MRN: [id number]  YOB: 1985  Date of evaluation: 10/15/2020    Procedure Summary     Date:  10/15/20 Room / Location:  [facility] AT [place] [address] / [facility]    Anesthesia Start:  2155 Anesthesia Stop:  6441    Procedure:  OPEN REDUCTION INTERNAL FIXATION RIGHT FIBULA FRACTURE, POSSIBLE SYNDESMOTIC LIGAMENT REPAIR -POSSIBLE BLOCK- (Right Ankle) Diagnosis:       Closed fracture of distal end of right fibula, unspecified fracture morphology, initial encounter      (RIGHT FIBULA FRACTURE)    Surgeon:  Jose Olvera MD Responsible Provider:  Niels Mixon MD    Anesthesia Type:  general ASA Status:  3        Anesthesia Type: general    Zoraida Phase I: Zoraida Score: 4    Zoraida Phase II: Zoraida Score: 10    Last vitals: Reviewed and per EMR flowsheets. Anesthesia Post Evaluation   Anesthetic Problems: no   Cardiovascular System Stable: yes  Respiratory Function: Airway Patent yes  ETT no  Ventilator no  Level of consciousness: awake, alert and oriented  Post-op pain: adequate analgesia  Hydration Adequate: yes  Nausea/Vomiting:no  Other Issues: Of note, patient had c/o weakness in her left arm. On exam, patient does have weakness when trying to dorsiflex her wrist, c/w radial nerve motor weakness. Patient related that she had \"slept weird on my arm last night\"> Likely this is a crutch-palsy- temporary in duration. Patient is alreasdy scheduled to f/u with orthopedics.     Jono Leal MD

## 2020-10-15 NOTE — OP NOTE
[address] [address]                                OPERATIVE REPORT    PATIENT NAME: Dom Hoff                  :        1985  MED REC NO:   [id number]                          ROOM:  ACCOUNT NO:   [de-identified]                           ADMIT DATE: 10/15/2020  PROVIDER:     Joanne Owens MD    DATE OF PROCEDURE:  10/15/2020    PREOPERATIVE DIAGNOSIS:  Right distal fibula fracture. POSTOPERATIVE DIAGNOSIS:  Right distal fibula fracture. OPERATION PERFORMED:  Open reduction and internal fixation of right  distal fibula fracture using Arthrex plate. PRIMARY SURGEON:  Joanne Owens MD    ANESTHESIA:  General.    ANTIBIOTICS:  Cleocin. DRAINS:  None. TUBES:  None. SPECIMENS:  None. ESTIMATED BLOOD LOSS:  Less than 50 mL. TOURNIQUET TIME:  Less than 45 minutes. INDICATIONS:  The patient is a 59-year-old female who slipped at home on  10/03/2020 and injured her right ankle and was found to have a distal  fibula fracture. She was previously scheduled for surgery, but did not  get her COVID testing or her history and physical done, so was canceled  until today. She now presents for open reduction and internal fixation. Risks and benefits of surgery were explained to the patient. Informed  consent was signed in the chart prior to surgery. PROCEDURE:  The patient was brought to the operating room and after  adequate general anesthesia, placed in a supine position. Nonsterile  tourniquet was placed around the proximal aspect of her right upper  thigh. Right lower extremity was prepped and draped in a sterile  fashion. Leg was exsanguinated and tourniquet inflated to 350 mmHg. At this point, a longitudinal incision was made extending from the tip  of the fibula approximately 8 cm. It was carried down through the  subcutaneous tissue using Metzenbaum scissors.   The superficial peroneal  nerve was identified and retracted anteriorly. The fracture was cleaned  of hematoma and was noted to be comminuted and ran from proximal  posterior to distal anterior. Fracture was cleaned of soft tissue and  then fenestrated due to the age of the fracture. Good bleeding surfaces  were noted. The fracture was reduced anatomically, held with a clamp while a 3.5  cortical lag screw was placed from proximal end to the distal posterior. Excellent position was noted. The Arthrex distal fibular locking plate  was contoured appropriately, laid along the lateral aspect of the  fibula, drilled and filled in the usual AO fashion using 2.7 locking  screws distally and 3.5 cortical and locking screws proximally. Excellent stability was noted. A hook test was performed and the  syndesmosis was noted to be intact. The wound was copiously irrigated with normal saline, closed using 2-0  Vicryl suture for the deep fascia, 3-0 Vicryl suture in inverted fashion  for the subcutaneous tissue and 4-0 Monocryl running subcuticular stitch  for the skin. The area was dressed with a ZipLine, dry sterile  dressing, sterile Webril, ABDs and placed into a well-padded posterior  splint. Tourniquet was deflated after less than 1 hour. Toes were  noted to pink up nicely. The patient was awakened in the operating room, brought to the PACU in  good condition. ADDENDUM:  Three views of the ankle were obtained multiple times  throughout the procedure. Final images were obtained, read by me and  showed that the distal fibula fracture was anatomically reduced. Mortise was well aligned held with the plate and screws.         Alva Mccormick MD    D: 10/15/2020 15:34:01       T: 10/15/2020 15:43:35     [email or web address]  Job#: [id number]     Doc#: [id number]    CC: Dermal Autograft Text: The defect edges were debeveled with a #15 scalpel blade.  Given the location of the defect, shape of the defect and the proximity to free margins a dermal autograft was deemed most appropriate.  Using a sterile surgical marker, the primary defect shape was transferred to the donor site. The area thus outlined was incised deep to adipose tissue with a #15 scalpel blade.  The harvested graft was then trimmed of adipose and epidermal tissue until only dermis was left.  The skin graft was then placed in the primary defect and oriented appropriately.

## 2020-10-15 NOTE — PROGRESS NOTES
Dr Lex Turner at bedside asessed pts  Lt wrist and arm- pt states she was sleeping on it last night and its getting better - Dr Lex Turner asked pt if she was shooting up in her armpit and she denied it- dr Emely Garcia encouraged pt to stop using iv drugs stating she could end up with permanent nerve damage in that arm- no further orders -ok for discharge :- pt  States last iv use was fentanyl early this am when she woke up  Pt alert and oriented/ pt answers questions  When asked. - encouraged no iv drug use - Respirations  easy unlabored   Pt DID NOT ask for any iv drugs or oral opioids while she was here in recovery room  - states has no pain/ pt awake /alert   Pt verbalizes she understanding of above and states she will not use when she gets home - states her mothers close friend is like a father to her and she will go home with him - she states he doesn't do drugs and wont allow it in his house: pt encouraged to get counseling and therapy and stay clean.

## 2020-10-15 NOTE — PROGRESS NOTES
Block Time Out    Per Dr. Romero Keepers  To RLE      Verified   Correct Pt.   Correct   Correct Procedure  Correct Site  Correct Extremity

## 2020-10-15 NOTE — BRIEF OP NOTE
Brief Postoperative Note      Patient: Granville Bosworth  YOB: 1985  MRN: [id number]    Date of Procedure: 10/15/2020    Pre-Op Diagnosis: RIGHT FIBULA FRACTURE    Post-Op Diagnosis: Same       Procedure(s):  OPEN REDUCTION INTERNAL FIXATION RIGHT FIBULA FRACTURE, POSSIBLE SYNDESMOTIC LIGAMENT REPAIR -POSSIBLE BLOCK-    Surgeon(s):  Dionne Kang MD    Assistant:  Surgical Assistant: Darcy Lynn    Anesthesia: General    Estimated Blood Loss (mL): Minimal    Complications: None    Specimens:   * No specimens in log *    Implants:  Implant Name Type Inv. Item Serial No.  Lot No. LRB No. Used Action   PLATE LK DISTL FIB 6HL RT Screw/Plate/Nail/Jeevan PLATE LK DISTL FIB 6HL RT  [employer]  Right 1 Implanted   SCREW LPROF LK SS 2.7X10MM Screw/Plate/Nail/Jeevan SCREW LPROF LK SS 2.7X10MM  [employer]  Right 1 Implanted   SCREW LK LPROF SS 2.7X16MM Screw/Plate/Nail/Jeevan SCREW LK LPROF SS 2.7X16MM  [employer]  Right 4 Implanted   SCREW EDUARDO LPROF NONLK SS 3.5X12MM Screw/Plate/Nail/Jeevan SCREW EDUARDO LPROF NONLK SS 3.5X12MM  [employer]  Right 1 Implanted   SCREW LOW PROFILE 3. [id number] Screw/Plate/Nail/Jeevan SCREW LOW PROFILE 3. [id number]  [employer]  Right 1 Implanted   SCREW LK LPROF SS 3.5X12MM Screw/Plate/Nail/Jeevan SCREW LK LPROF SS 3.5X12MM  [employer]  Right 2 Implanted   SCREW LK LPROF SS 3.5X14MM Screw/Plate/Nail/Jeevan SCREW LK LPROF SS 3.5X14MM  [employer]  Right 1 Implanted         Drains: * No LDAs found *    Findings: Distal fibula fracture no syndesmotic ligament disruption    Electronically signed by Dionne Kang MD on 10/15/2020 at 3:21 PM

## 2020-10-18 LAB
ALBUMIN SERPL-MCNC: 3.9 G/DL
ALP BLD-CCNC: 111 U/L
ALT SERPL-CCNC: 34 U/L
ANION GAP SERPL CALCULATED.3IONS-SCNC: 11 MMOL/L
AST SERPL-CCNC: 41 U/L
BASOPHILS ABSOLUTE: 0.1 /ΜL
BASOPHILS RELATIVE PERCENT: 1 %
BILIRUB SERPL-MCNC: 0.4 MG/DL (ref 0.1–1.4)
BUN BLDV-MCNC: 23 MG/DL
CALCIUM SERPL-MCNC: 9.4 MG/DL
CHLORIDE BLD-SCNC: 104 MMOL/L
CO2: 25 MMOL/L
CREAT SERPL-MCNC: 0.92 MG/DL
EOSINOPHILS ABSOLUTE: 0.4 /ΜL
EOSINOPHILS RELATIVE PERCENT: 5 %
GFR CALCULATED: 80
GLUCOSE BLD-MCNC: 187 MG/DL
HCT VFR BLD CALC: 35.2 % (ref 36–46)
HEMOGLOBIN: 11.1 G/DL (ref 12–16)
LYMPHOCYTES ABSOLUTE: 2.1 /ΜL
LYMPHOCYTES RELATIVE PERCENT: 25 %
MCH RBC QN AUTO: 24.9 PG
MCHC RBC AUTO-ENTMCNC: 31.5 G/DL
MCV RBC AUTO: 79 FL
MONOCYTES ABSOLUTE: 0.6 /ΜL
MONOCYTES RELATIVE PERCENT: 7 %
NEUTROPHILS ABSOLUTE: 5.3 /ΜL
NEUTROPHILS RELATIVE PERCENT: 62 %
PDW BLD-RTO: 17.3 %
PLATELET # BLD: 510 K/ΜL
PMV BLD AUTO: 6.5 FL
POTASSIUM SERPL-SCNC: 3.9 MMOL/L
RBC # BLD: 4.45 10^6/ΜL
SODIUM BLD-SCNC: 140 MMOL/L
TOTAL PROTEIN: 7.8
WBC # BLD: 8.4 10^3/ML

## 2020-10-23 ENCOUNTER — TELEPHONE (OUTPATIENT)
Dept: ORTHOPEDIC SURGERY | Age: 35
End: 2020-10-23

## 2020-10-23 RX ORDER — CEPHALEXIN 500 MG/1
CAPSULE ORAL
Qty: 12 CAPSULE | Refills: 0 | Status: SHIPPED | OUTPATIENT
Start: 2020-10-23 | End: 2020-12-08

## 2020-10-23 RX ORDER — ZOLPIDEM TARTRATE 10 MG/1
TABLET ORAL
Qty: 15 TABLET | Refills: 0 | Status: SHIPPED | OUTPATIENT
Start: 2020-10-23 | End: 2020-11-01 | Stop reason: SDUPTHER

## 2020-10-23 NOTE — TELEPHONE ENCOUNTER
PATIENT CALL REGARDING GETTING PRESCRIPTION PRIOR TO APPOINTMENT ON WEDNESDAY 10/28/20. [phone]

## 2020-10-30 ENCOUNTER — TELEPHONE (OUTPATIENT)
Dept: ORTHOPEDIC SURGERY | Age: 35
End: 2020-10-30

## 2020-11-01 ENCOUNTER — PATIENT MESSAGE (OUTPATIENT)
Dept: ORTHOPEDIC SURGERY | Age: 35
End: 2020-11-01

## 2020-11-02 RX ORDER — ZOLPIDEM TARTRATE 10 MG/1
5 TABLET ORAL NIGHTLY PRN
Qty: 15 TABLET | Refills: 0 | Status: SHIPPED | OUTPATIENT
Start: 2020-11-02 | End: 2020-11-16

## 2020-11-02 NOTE — TELEPHONE ENCOUNTER
From: Ashwini Kaye  To: Pallavi Patel MD  Sent: 11/1/2020 6:58 AM EST  Subject: Prescription Question    Is there something you can prescribe for the nerve pain? ? Its worse than i think the actual break was(well im sure it didn't seem that way then but it does now). My entire leg hurts up past my knee.

## 2020-11-03 RX ORDER — GABAPENTIN 100 MG/1
CAPSULE ORAL
Qty: 49 CAPSULE | Refills: 0 | Status: SHIPPED | OUTPATIENT
Start: 2020-11-03 | End: 2020-11-25 | Stop reason: SDUPTHER

## 2020-11-04 ENCOUNTER — OFFICE VISIT (OUTPATIENT)
Dept: ORTHOPEDIC SURGERY | Age: 35
End: 2020-11-04
Payer: MEDICAID

## 2020-11-04 ENCOUNTER — PATIENT MESSAGE (OUTPATIENT)
Dept: ORTHOPEDIC SURGERY | Age: 35
End: 2020-11-04

## 2020-11-04 VITALS — HEIGHT: 64 IN | BODY MASS INDEX: 26.46 KG/M2 | WEIGHT: 154.98 LBS

## 2020-11-04 PROCEDURE — 99024 POSTOP FOLLOW-UP VISIT: CPT | Performed by: ORTHOPAEDIC SURGERY

## 2020-11-04 PROCEDURE — 29405 APPL SHORT LEG CAST: CPT | Performed by: ORTHOPAEDIC SURGERY

## 2020-11-04 NOTE — TELEPHONE ENCOUNTER
From: Marya Tejeda  To: Cleophas Duane, MD  Sent: 11/4/2020 1:26 PM EST  Subject: Non-Urgent Medical Question    I am late went to wrong lace please don't.  Cancel my appointment

## 2020-11-18 ENCOUNTER — OFFICE VISIT (OUTPATIENT)
Dept: ORTHOPEDIC SURGERY | Age: 35
End: 2020-11-18

## 2020-11-18 VITALS — BODY MASS INDEX: 26.46 KG/M2 | WEIGHT: 154.98 LBS | HEIGHT: 64 IN

## 2020-11-18 PROCEDURE — 99024 POSTOP FOLLOW-UP VISIT: CPT | Performed by: ORTHOPAEDIC SURGERY

## 2020-11-18 NOTE — LETTER
[address] [place] [address]  [address] [address]  Phone: [phone]  Fax: [phone]    Nader Reich MD        November 18, 2020     Patient: Humble Nunez   YOB: 1985   Date of Visit: 11/18/2020       To Whom It May Concern: It is my medical opinion that Jennifer Peralta will not be weight bearing in boot for the next 3 weeks due to her Orthopedic Condition. Patient will return for further assessment in 3 weeks with physician. SX 10/15/2020 Right Ankle    If you have any questions or concerns, please don't hesitate to call.     Sincerely,                  Nader Reich MD

## 2020-11-18 NOTE — PROGRESS NOTES
Subjective: Patient is here for follow-up of her 10/15/2020 right distal fibula fracture open reduction internal fixation. She states her pain is around a 4 out of 10 just little sore. She is out of cast today  Objective: Physical exam shows incision looks good almost completely healed on the lateral aspect of the right ankle. No evidence of DVT. She is 5 to 10 degrees of dorsiflexion 40 degrees of plantarflexion strength is at least 3+/5  Imaging: 3 views of the right ankle show the fracture well aligned appears to be well-healed mortise is well reduced  Assessment and plan: This patient is doing better. I gave her the weightbearing handout she already had a boot that she brought with her and I will see her back in 3 weeks.   Repeat x-rays and hopefully we can start physical therapy and get her in a brace

## 2020-11-25 RX ORDER — GABAPENTIN 100 MG/1
CAPSULE ORAL
Qty: 49 CAPSULE | Refills: 0 | Status: SHIPPED | OUTPATIENT
Start: 2020-11-25 | End: 2020-12-23 | Stop reason: SDUPTHER

## 2020-12-08 ENCOUNTER — OFFICE VISIT (OUTPATIENT)
Dept: INTERNAL MEDICINE CLINIC | Age: 35
End: 2020-12-08
Payer: MEDICAID

## 2020-12-08 PROBLEM — F31.78 BIPOLAR 1 DISORDER, MIXED, FULL REMISSION (HCC): Status: ACTIVE | Noted: 2020-12-08

## 2020-12-08 PROBLEM — M79.2 NEURALGIA OF UPPER EXTREMITY: Status: ACTIVE | Noted: 2020-12-08

## 2020-12-08 PROCEDURE — G8419 CALC BMI OUT NRM PARAM NOF/U: HCPCS | Performed by: INTERNAL MEDICINE

## 2020-12-08 PROCEDURE — G8482 FLU IMMUNIZE ORDER/ADMIN: HCPCS | Performed by: INTERNAL MEDICINE

## 2020-12-08 PROCEDURE — 99203 OFFICE O/P NEW LOW 30 MIN: CPT | Performed by: INTERNAL MEDICINE

## 2020-12-08 PROCEDURE — G8427 DOCREV CUR MEDS BY ELIG CLIN: HCPCS | Performed by: INTERNAL MEDICINE

## 2020-12-08 PROCEDURE — 4004F PT TOBACCO SCREEN RCVD TLK: CPT | Performed by: INTERNAL MEDICINE

## 2020-12-08 PROCEDURE — 99385 PREV VISIT NEW AGE 18-39: CPT | Performed by: INTERNAL MEDICINE

## 2020-12-08 RX ORDER — OXCARBAZEPINE 300 MG/1
900 TABLET, FILM COATED ORAL DAILY
Qty: 90 TABLET | Refills: 0 | Status: SHIPPED | OUTPATIENT
Start: 2020-12-08 | End: 2020-12-23 | Stop reason: SDUPTHER

## 2020-12-08 RX ORDER — NORTRIPTYLINE HYDROCHLORIDE 50 MG/1
25 CAPSULE ORAL NIGHTLY
Qty: 30 CAPSULE | Refills: 0 | Status: SHIPPED | OUTPATIENT
Start: 2020-12-08 | End: 2020-12-23 | Stop reason: SDUPTHER

## 2020-12-08 RX ORDER — HYDROXYZINE PAMOATE 50 MG/1
50 CAPSULE ORAL DAILY
Qty: 30 CAPSULE | Refills: 0 | Status: SHIPPED | OUTPATIENT
Start: 2020-12-08 | End: 2020-12-23 | Stop reason: SDUPTHER

## 2020-12-08 RX ORDER — ASPIRIN 325 MG
325 TABLET ORAL DAILY
COMMUNITY
End: 2021-09-09 | Stop reason: ALTCHOICE

## 2020-12-08 ASSESSMENT — PATIENT HEALTH QUESTIONNAIRE - PHQ9
SUM OF ALL RESPONSES TO PHQ QUESTIONS 1-9: 0
1. LITTLE INTEREST OR PLEASURE IN DOING THINGS: 0
SUM OF ALL RESPONSES TO PHQ9 QUESTIONS 1 & 2: 0
2. FEELING DOWN, DEPRESSED OR HOPELESS: 0
SUM OF ALL RESPONSES TO PHQ QUESTIONS 1-9: 0
SUM OF ALL RESPONSES TO PHQ QUESTIONS 1-9: 0

## 2020-12-08 NOTE — PATIENT INSTRUCTIONS
Preventive plan of care for [facility]        12/8/2020           Preventive Measures Status       Recommendations for screening                   Diabetes Screen  No results found for: GLUCOSE Repeat yearly   Cholesterol Screen  No results found for: CHOL, TRIG, HDL, LDLCALC, LDLDIRECT Test recommended and ordered       Weight: Body mass index is 22.31 kg/m². 5' 4\" (1.626 m)130 lb (59 kg)    Your BMI is between 18.5 and 24.9, which indicates that you are at a healthy weight        Recommended Immunizations      There is no immunization history on file for this patient.      Influenza vaccine:  recommended every fall, recommended now, but will be administered this week  Pneumonia vaccine: recommended now, but will be administered this week  Tetanus vaccine:  tetanus diptheria and pertussis vaccine (Tdap) due now, but will be administered this week         Other Recommendations ·   See a dentist every 6 months  · Try to get at least 30 minutes of exercise 3-5 days per week  · Always wear a seat belt when traveling in a car  · Always wear a helmet when riding a bicycle or motorcycle  · When exposed to the sun, use a sunscreen that protects against both UVA and UVB radiation with an SPF of 30 or greater- reapply every 2 to 3 hours or after sweating, drying off with a towel, or swimming  · You need 500 mg of calcium and 7867-1671 IU of vitamin D per day- it is possible to meet your calcium requirement with diet alone, but a vitamin D supplement is usually necessary

## 2020-12-08 NOTE — PROGRESS NOTES
[facility]  History and Physical  Julia Sofia M.D. Lashay Torrez  YOB: 1985    Date of Service:  12/8/2020    Chief Complaint:   Lashay Torrez is a 28 y.o. female who presents for   Chief Complaint   Patient presents with   [facility] ED Follow-up    Establish Care     Pursuant to the emergency declaration under the [facility] [address] authority and the [employer] Act, this Virtual  Video Visit was conducted, with patient's consent, to reduce the patient's risk of exposure to COVID-19 and provide continuity of care. Service is  provided through a video synchronous discussion virtually to substitute for in-person clinic visit with the patient being at home and Dr. Deb Quiñones being at home. HPI: Here for Annual Physical and Follow up. Bipolar: stable on Trileptal 300 mg 3 qd, nortryptyline 50 mg qd and hydroxyzine 50 mg qd. She does feel a little tired since she took all of them in the AM due to meds started in [facility] but would like to start taking some at night to see if sedation improves.     Right ankle fx and pain and arm pain:  Improved on neurontin 100 mg bid per Dr. Sidney Lozano    No results found for: Uriel Mast  No results found for: NA, K, CL, CO2, BUN, CREATININE, GLUCOSE, CALCIUM  No results found for: CHOL, TRIG, HDL, LDLCALC, LDLDIRECT  No results found for: ALT, AST  No results found for: TSH, T4FREE  No results found for: WBC, HGB, HCT, MCV, PLT  No results found for: INR   No results found for: PSA   No results found for: LABURIC     Wt Readings from Last 3 Encounters:   12/08/20 130 lb (59 kg)   11/18/20 154 lb 15.7 oz (70.3 kg)   11/04/20 154 lb 15.7 oz (70.3 kg)     BP Readings from Last 3 Encounters:   10/15/20 (!) 118/55   10/15/20 131/85   10/12/20 121/81       Patient Active Problem List   Diagnosis    Closed fracture of right distal fibula       Allergies Cardiovascular: Normal rate, regular rhythm, normal heart sounds and intact distal pulses. Pulmonary/Chest: Effort normal and breath sounds normal. No stridor. No respiratory distress. No wheezes and no rales. Abdominal: Soft. Bowel sounds are normal. No distension and no mass. No tenderness. No rebound and no guarding. Musculoskeletal: No edema and no tenderness. Skin: No rash or erythema. Psychiatric: Normal mood and affect. Behavior is normal.     Preventive Care:  Health Maintenance   Topic Date Due    Varicella vaccine (1 of 2 - 2-dose childhood series) 01/25/1986    Pneumococcal 0-64 years Vaccine (1 of 1 - PPSV23) 01/25/1991    HIV screen  01/25/2000    DTaP/Tdap/Td vaccine (1 - Tdap) 01/25/2004    Cervical cancer screen  01/25/2006    Flu vaccine (1) 09/01/2020    Hepatitis A vaccine  Aged Out    Hepatitis B vaccine  Aged Out    Hib vaccine  Aged Out    Meningococcal (ACWY) vaccine  Aged Out      Hx abnormal PAP: no  Sexual activity: has sex with males   Last eye exam: 2020, normal  Exercise: no regular exercise       Preventive plan of care for Carlos Enrique Arias        12/8/2020           Preventive Measures Status       Recommendations for screening                   Diabetes Screen  No results found for: GLUCOSE Repeat yearly   Cholesterol Screen  No results found for: CHOL, TRIG, HDL, LDLCALC, LDLDIRECT Test recommended and ordered       Weight: Body mass index is 22.31 kg/m². 5' 4\" (1.626 m)130 lb (59 kg)    Your BMI is between 18.5 and 24.9, which indicates that you are at a healthy weight        Recommended Immunizations      There is no immunization history on file for this patient.      Influenza vaccine:  recommended every fall, recommended now, but will be administered this week  Pneumonia vaccine: recommended now, but will be administered this week  Tetanus vaccine:  tetanus diptheria and pertussis vaccine (Tdap) due now, but will be administered this week         Other Recommendations ·   See a dentist every 6 months  · Try to get at least 30 minutes of exercise 3-5 days per week  · Always wear a seat belt when traveling in a car  · Always wear a helmet when riding a bicycle or motorcycle  · When exposed to the sun, use a sunscreen that protects against both UVA and UVB radiation with an SPF of 30 or greater- reapply every 2 to 3 hours or after sweating, drying off with a towel, or swimming  · You need 500 mg of calcium and 5766-6959 IU of vitamin D per day- it is possible to meet your calcium requirement with diet alone, but a vitamin D supplement is usually necessary                 Assessment/Plan:    Arsalan Recinosand was seen today for ed follow-up and establish care. Diagnoses and all orders for this visit:    Annual physical exam  -     Hemoglobin A1C; Future  -     Lipid Panel; Future  -     Hemoglobin and Hematocrit, Blood; Future    Bipolar 1 disorder, mixed, full remission (HCC)  -     OXcarbazepine (TRILEPTAL) 300 MG tablet; Take 3 tablets by mouth daily  Change nortriptyline (PAMELOR) 50 MG capsule; Take 1 capsule by mouth nightly  Change hydrOXYzine (VISTARIL) 50 MG capsule; Take 1 capsule by mouth daily prn    Hyperglycemia  -     Hemoglobin A1C; Future    Neuralgia of upper extremity    continue neurontin if still persistent next month    Return Jan 6 at 2:20 Bipolar f/u.

## 2020-12-09 ENCOUNTER — OFFICE VISIT (OUTPATIENT)
Dept: ORTHOPEDIC SURGERY | Age: 35
End: 2020-12-09
Payer: MEDICAID

## 2020-12-09 ENCOUNTER — NURSE ONLY (OUTPATIENT)
Dept: INTERNAL MEDICINE CLINIC | Age: 35
End: 2020-12-09
Payer: MEDICAID

## 2020-12-09 VITALS
DIASTOLIC BLOOD PRESSURE: 84 MMHG | BODY MASS INDEX: 25.27 KG/M2 | HEART RATE: 122 BPM | SYSTOLIC BLOOD PRESSURE: 130 MMHG | HEIGHT: 64 IN | TEMPERATURE: 97.4 F | WEIGHT: 148 LBS | OXYGEN SATURATION: 98 % | TEMPERATURE: 97.4 F | HEART RATE: 122 BPM | SYSTOLIC BLOOD PRESSURE: 130 MMHG | HEIGHT: 64 IN | BODY MASS INDEX: 25.27 KG/M2 | DIASTOLIC BLOOD PRESSURE: 84 MMHG | OXYGEN SATURATION: 98 % | WEIGHT: 148 LBS

## 2020-12-09 VITALS — BODY MASS INDEX: 25.25 KG/M2 | WEIGHT: 147.93 LBS | HEIGHT: 64 IN

## 2020-12-09 LAB
CHOLESTEROL, TOTAL: 141 MG/DL (ref 0–199)
HCT VFR BLD CALC: 33.7 % (ref 36–48)
HDLC SERPL-MCNC: 61 MG/DL (ref 40–60)
HEMOGLOBIN: 11 G/DL (ref 12–16)
LDL CHOLESTEROL CALCULATED: 60 MG/DL
TRIGL SERPL-MCNC: 98 MG/DL (ref 0–150)
VLDLC SERPL CALC-MCNC: 20 MG/DL

## 2020-12-09 PROCEDURE — 90686 IIV4 VACC NO PRSV 0.5 ML IM: CPT | Performed by: INTERNAL MEDICINE

## 2020-12-09 PROCEDURE — 90471 IMMUNIZATION ADMIN: CPT | Performed by: INTERNAL MEDICINE

## 2020-12-09 PROCEDURE — 90732 PPSV23 VACC 2 YRS+ SUBQ/IM: CPT | Performed by: INTERNAL MEDICINE

## 2020-12-09 PROCEDURE — 90472 IMMUNIZATION ADMIN EACH ADD: CPT | Performed by: INTERNAL MEDICINE

## 2020-12-09 PROCEDURE — 36415 COLL VENOUS BLD VENIPUNCTURE: CPT | Performed by: INTERNAL MEDICINE

## 2020-12-09 PROCEDURE — 99024 POSTOP FOLLOW-UP VISIT: CPT | Performed by: ORTHOPAEDIC SURGERY

## 2020-12-09 PROCEDURE — L1902 AFO ANKLE GAUNTLET PRE OTS: HCPCS | Performed by: ORTHOPAEDIC SURGERY

## 2020-12-09 PROCEDURE — 90715 TDAP VACCINE 7 YRS/> IM: CPT | Performed by: INTERNAL MEDICINE

## 2020-12-09 NOTE — PROGRESS NOTES
Subjective: Patient is here for follow-up of her 10/15/2020 right distal fibula fracture open reduction internal fixation. She states her pain is still 4 out of 10. She has swelling. At times it is down to a 1 or 2 out of 10. She is about 25% weightbearing and thinks the gabapentin is helping her  Objective: Physical exam shows incisions well-healed no evidence of infection sensations intact she has mild swelling over the anterior lateral aspect of the ankle anterior drawer and talar tilt show no gross laxity no midfoot instability no evidence of DVT  Imaging: 3 views of the right ankle show the fracture healing nicely mortise is well reduced  Assessment and plan: Overall this patient is doing much better.   I gave her a prescription for physical therapy weightbearing as tolerated wean to a brace and put her in a Lolis brace that we gave her today and she will follow-up with me in 6 weeks repeat x-rays

## 2020-12-10 LAB
ESTIMATED AVERAGE GLUCOSE: 111.2 MG/DL
HBA1C MFR BLD: 5.5 %

## 2020-12-17 ENCOUNTER — TELEPHONE (OUTPATIENT)
Dept: ORTHOPEDIC SURGERY | Age: 35
End: 2020-12-17

## 2020-12-23 RX ORDER — OXCARBAZEPINE 300 MG/1
900 TABLET, FILM COATED ORAL DAILY
Qty: 90 TABLET | Refills: 0 | Status: SHIPPED | OUTPATIENT
Start: 2020-12-23

## 2020-12-23 RX ORDER — NORTRIPTYLINE HYDROCHLORIDE 50 MG/1
25 CAPSULE ORAL NIGHTLY
Qty: 30 CAPSULE | Refills: 0 | Status: SHIPPED | OUTPATIENT
Start: 2020-12-23

## 2020-12-23 RX ORDER — HYDROXYZINE PAMOATE 50 MG/1
50 CAPSULE ORAL DAILY
Qty: 30 CAPSULE | Refills: 0 | Status: SHIPPED | OUTPATIENT
Start: 2020-12-23

## 2020-12-23 NOTE — TELEPHONE ENCOUNTER
Refill request for vistaril, pamelor, trileptal medication.      Name [facility]      Last visit - 12/8/20     Pending visit - 1/6/21    Last refill -12/8/20      Medication Contract signed -N/A   Last Oarrs ran- N/A        Additional Comments

## 2020-12-28 RX ORDER — GABAPENTIN 100 MG/1
CAPSULE ORAL
Qty: 49 CAPSULE | Refills: 0 | Status: SHIPPED | OUTPATIENT
Start: 2020-12-28 | End: 2021-01-19 | Stop reason: SDUPTHER

## 2021-01-08 ENCOUNTER — HOSPITAL ENCOUNTER (OUTPATIENT)
Dept: PHYSICAL THERAPY | Age: 36
Setting detail: THERAPIES SERIES
Discharge: HOME | End: 2021-01-08

## 2021-01-08 NOTE — FLOWSHEET NOTE
[facility] [address] [facility], [address] [place] [address] [address]  Phone: [phone]   Fax:     [phone]    Physical Therapy  Cancellation/No-show Note  Patient Name:  Janae Jaimes  :  1985   Date:  2021    Cancelled visits to date: 0  No-shows to date: 1    For today's appointment patient:  []  Cancelled  []  Rescheduled appointment  [x]  No-show     Reason given by patient:  []  Patient ill  []  Conflicting appointment  []  No transportation    []  Conflict with work  []  No reason given  []  Other:     Comments:      Phone call information:   []  Phone call made today to patient at _ time at number provided:      []  Patient answered, conversation as follows:    []  Patient did not answer, message left as follows:  []  Phone call not made today  []  Phone call not needed - pt contacted us to cancel and provided reason for cancellation.      Electronically signed by:  Sher Rivera PT

## 2021-01-13 ENCOUNTER — HOSPITAL ENCOUNTER (OUTPATIENT)
Dept: PHYSICAL THERAPY | Age: 36
Setting detail: THERAPIES SERIES
Discharge: HOME | End: 2021-01-13
Payer: MEDICAID

## 2021-01-13 NOTE — FLOWSHEET NOTE
[facility], [address] [place] [address] [address]  Phone: [phone]   Fax:     [phone]    Physical Therapy  Cancellation/No-show Note  Patient Name:  Kristopher Orozco  :  1985   Date:  2021    Cancelled visits to date: 1  No-shows to date: 1    For today's appointment patient:  []  Cancelled  [x]  Rescheduled appointment  []  No-show     Reason given by patient:  []  Patient ill  []  Conflicting appointment  []  No transportation    [x]  Conflict with work  []  No reason given  []  Other:     Comments:      Phone call information:   []  Phone call made today to patient at _ time at number provided:      []  Patient answered, conversation as follows:    []  Patient did not answer, message left as follows:  []  Phone call not made today  [x]  Phone call not needed - pt contacted us to cancel and provided reason for cancellation.      Electronically signed by:  Tosin Reyes PT

## 2021-01-18 ENCOUNTER — HOSPITAL ENCOUNTER (OUTPATIENT)
Dept: PHYSICAL THERAPY | Age: 36
Setting detail: THERAPIES SERIES
Discharge: HOME | End: 2021-01-18
Payer: MEDICAID

## 2021-01-18 NOTE — FLOWSHEET NOTE
[facility] [id number] [facility], [address] [place] [address] [address]  Phone: [phone]   Fax:     [phone]    Physical Therapy  Cancellation/No-show Note  Patient Name:  Ginna Erickson  :  1985   Date:  2021    Cancelled visits to date: 1  No-shows to date: 2    For today's appointment patient:  []  Cancelled  []  Rescheduled appointment  [x]  No-show     Reason given by patient:  []  Patient ill  []  Conflicting appointment  []  No transportation    []  Conflict with work  []  No reason given  []  Other:     Comments:      Phone call information:   []  Phone call made today to patient at _ time at number provided:      []  Patient answered, conversation as follows:    [x]  Patient did not answer, message left as follows: Patient to not be scheduled at facility after failing to comply with attendance policy. []  Phone call not made today  []  Phone call not needed - pt contacted us to cancel and provided reason for cancellation.      Electronically signed by:  Jackelyn Holbrook PT

## 2021-01-19 ENCOUNTER — PATIENT MESSAGE (OUTPATIENT)
Dept: ORTHOPEDIC SURGERY | Age: 36
End: 2021-01-19

## 2021-01-19 DIAGNOSIS — S82.891A CLOSED FRACTURE OF RIGHT ANKLE, INITIAL ENCOUNTER: Primary | ICD-10-CM

## 2021-01-19 RX ORDER — GABAPENTIN 100 MG/1
100 CAPSULE ORAL 2 TIMES DAILY
Qty: 60 CAPSULE | Refills: 2 | Status: SHIPPED | OUTPATIENT
Start: 2021-01-19 | End: 2021-04-19

## 2021-02-17 ENCOUNTER — HOSPITAL ENCOUNTER (OUTPATIENT)
Dept: PHYSICAL THERAPY | Age: 36
Setting detail: THERAPIES SERIES
Discharge: HOME | End: 2021-02-17
Payer: MEDICAID

## 2021-02-17 NOTE — FLOWSHEET NOTE
Physical Therapy  Cancellation/No-show Note    Patient Name:  Kristopher Orozco  :  1985   Date:  2021  Cancels to Date: 2  No-shows to Date: 2    For today's appointment patient:  [x]  Cancelled  []  Rescheduled appointment  []  No-show     Reason given by patient:  []  Patient ill  []  Conflicting appointment  []  No transportation    []  Conflict with work  []  No reason given  [x]  Other:  Ice on car   Comments:      Electronically signed by:   Wendy Rodriguez, [id number]

## 2021-02-18 ENCOUNTER — HOSPITAL ENCOUNTER (OUTPATIENT)
Dept: PHYSICAL THERAPY | Age: 36
Setting detail: THERAPIES SERIES
Discharge: HOME | End: 2021-02-18
Payer: MEDICAID

## 2021-02-18 NOTE — PROGRESS NOTES
\"Assessment and plan: Overall this patient is doing much better. I gave her a prescription for physical therapy weightbearing as tolerated wean to a brace and put her in a Lolis brace that we gave her today and she will follow-up with me in 6 weeks repeat x-rays\"    C-SSRS Triggered by Intake questionnaire (Past 2 wk assessment):    [] No, Questionnaire did not trigger screening.    [] Yes, Patient intake triggered further evaluation    [] C-SSRS Screening completed   [] PCP notified via Plan of Care   [] Emergency services notified    Preferred Language for HealthCare:   [x]English   []Other:     Plan of care sent to provider:      [x]Faxed 21  []Co-signature    (attempts: 1[x] 2 []3[])         Pt's Occupation/Job Duties:  ***      Social support/Environment:    Family/caregiver support:       {yes no:86408}     Home environment:   {Preadmission Environment Home Environment:75477}  Steps to enter first floor:    {Preadmission Environment Steps 1st:28614}   Steps to second floor:  {Steps to enter 2nd floor:54273}  Bathroom:    {Preadmission Environment Bathroom:32180}  Equipment owned:   {Preadmission Environment Equip Owned:76322}    Health History reviewed with pt:   {yes no:05459}        SUBJECTIVE FINDINGS        History of Present Illness:  ***           Pain       Patient describes pain to be {descriptor:01579}  Pain location:  ***  Patient reports pain is:  {pain number:68187}/10 pain at present    {pain number:77938}/10 pain at its worst.  Worsened by {worsening pain:40186}  Improved by  {improved pain:56806}  Pt. reports pain with coughing, sneezing and laughing:    {yes no:13191}  Pt. reports bowel and bladder changes:      {yes no:26962}  Pt. reports {lower joints:71711} {feelin}     Current Functional Limitations:   {yes no:35027}  Functional complaints:    ***  PLOF:      {functional limitations:44561}  Pt's sleep is affected?     {yes no:36871}    Patient goal for therapy:  \"***\" OBJECTIVE FINDINGS         Gait/Steps/Balance      Gait   Level of Assistance needed:     {Level of Assist:67264}  Gait Deviations (firm surface/linoleum):     {Gait Deviations:19809}  Assistive Device Used:     {OP ADs:27310}    Stairs  Level of Assistance needed:      {Level of Assist:89401}  Pt able to negotiate up/down 4 stairs:  {stairs:53299}  Assistive Device Used:      {OP ADs:03621}  Deficits noted:      ***    Balance  Static Sitting:  {Normal Good Fair Poor:50983}  Dynamic Sitting: {Normal Good Fair Poor:54174}  Static Standing:  {Normal Good Fair Poor:47212}  Dynamic Standing: {Normal Good Fair Poor:43656}    Posture:   {posture:23416}    Special Tests- standing     {Lumbar standing special tests:54256}    Lumbar Range of Motion/Resisted Testing     [x] Resisted testing deferred     ROM (*denotes pain) AROM Resisted COMMENTS   Flexion {ROM:52788}  {referral pattern:04653}   Extension {ROM:60496}  {referral pattern:26151}   Sidebending Left {ROM:29811}  {referral pattern:33122}   Sidebending Right {ROM:35560}  {referral pattern:20066}   Rotation Left   [x]Seated   []Standing   {ROM:63628}    {referral pattern:36488}   Rotation Right  [x]Seated   []Standing   {ROM:39248}     {referral pattern:01404}       Reflexes/Trunk Strength   Formal strength tested on 5 point scale, otherwise noted below    Reflex Left Right   Quadriceps (L3,4) {reflex:14187} {reflex:62127}   Achilles (S1,2) {reflex:16612} {reflex:47879}   Ankle clonus {clonus:82729} {clonus:30321}   Sylvia's reflex  {clonus:13027} {clonus:76063}   Strength     Trunk Extensors {Strength Assessment:56891}    Gluteals {Strength Assessment:65794}    Abdominals {Strength Assessment:18868}      Sensation  [x] All WNL exceptions noted below         Dermatome Left Right   Anterior groin, 2-3 inches below ASIS (L1-L2) {WNL Impaired:72102} {WNL Impaired:83593}   Middle third anterior thigh (L3) {WNL Impaired:42527} {WNL Impaired:68285} Patella and med malleolus (L4) {WNL Impaired:95692} {WNL Impaired:26039}   Fibular head and dorsum of foot (L5) {WNL Impaired:52192} {WNL Impaired:98072}   Lateral side and plantar surface of foot (S1) {WNL Impaired:78404} {WNL Impaired:82713}   Medial aspect of posterior thigh (S2) {WNL Impaired:42374} {WNL Impaired:78128}       LE Range of Motion/Strength Testing   All strength tested on 5 point scale  All ROM WFL with exceptions noted below  \"*\" Indicates pain with movement    Range Tested AROM PROM MMT/Resisted Comments   *denotes pain Left Right Left Right Left Right    Hip Flexion (L1-2)     {Strength Assessment:93714} {Strength Assessment:65941}    Hip Extension     {Strength Assessment:18489} {Strength Assessment:61354}    Hip Abduction  (L5)     {Strength Assessment:87840} {Strength Assessment:23338}    Hip Adduction  (L3)     {Strength Assessment:04666} {Strength Assessment:62837}    Hip IR     {Strength Assessment:69548} {Strength Assessment:77971}    Hip ER     {Strength Assessment:67119} {Strength Assessment:16653}    Knee Flexion  (L5,S1)     {Strength Assessment:77441} {Strength Assessment:97068}    Knee Extension  (L3-4)     {Strength Assessment:25595} {Strength Assessment:54527}    Ankle Dorsiflex  (L4)     {Strength Assessment:68923} {Strength Assessment:16571}    Ankle Plantarflex (S1,2)     {Strength Assessment:08482} {Strength Assessment:07117}    Ankle Inversion     {Strength Assessment:60380} {Strength Assessment:42685}    Ankle Eversion     {Strength Assessment:52265} {Strength Assessment:43581}    Great toe extension (L5)     {Strength Assessment:48290} {Strength Assessment:37446}      Special Tests- seated     {Lumbar seated special test:10951}    Flexibility     {Lower flexibility :24659}    Special Tests Lumbosacral and hip- supine/sidelying/prone   {Lumbosacral/hip special tests:01759}    Special Tests- knee and ankle    {Knee/ankle special tests:01412}    Girth Measurements (cm) Location Left Right Location Left Right   6 superior to superior patellar pole   3\" inferior to inferior patellar pole     3 superior to superior patellar pole    6\" inferior to inferior patellar pole     Superior patellar pole   Malleoli     Inferior patellar pole   Figure 8        Met heads        Specific Joint Mobility Testing/Accessory Motions      Lumbar:    {lumbar:95446}  Hip/acetabulofemoral joint:  {joint mobility:79656}  Knee/patella:    {knee joint :54525}  Ankle:     {ankle:72815}    Palpation     Patient reported tenderness with palpation:   {yes no:37377}  PT notes warmth:      {yes no:25991}  PT notes increased muscle tone:    {yes no:45750}  PT notes crepitus with palpation:     {yes no:63846}  Ligament tenderness/provocation:     {yes no:31892}  PT notes decreased scar mobility:    {yes no:93033}    Appearance    PT notes swelling: {yes no:73404}  PT notes redness:   {yes no:06177}  PT notes drainage:    {yes no:29888}    Functional Outcome Measure:     Testing:   {yes no:53570}  Measure Used:  {GBK:19961}  Date Assessed:  2/18/2021  Score:    {Numbers; 0-100:31748}/{score:85099}    ASSESSMENT   Pt is {Numbers; 1-257:53033} yo {GENDER:943956098} presenting to OP PT clinic with medical diagnosis of ***. Presents today with *** Would benefit from continued OP PT to address {below impairments:75242}. Problems        {lower impairements:31667}    Rehabilitation Potential:  Good for goals listed below.     Strengths for achieving goals include:  {Strengths for Achieving Goals:21499}  Limitations for achieving goals include:  {barriers:51014}    Prognosis:   {Good Fair Poor:27799}    GOALS  Goals established 2/18/21   Short Term Goals:  *** weeks MET NOT MET Long Term Goals:  *** weeks MET NOT MET    Establish HEP  []  [] Pt independent with HEP [] []    Pain  {pain number:22519}/10 or less [] [] Pain  {pain number:93403}/10 or less [] []   {strength goal:03732} [] [] {strength goal:82078} [] [] {ROM goal:25693} [] [] {ROM goal:63842} [] []   {functional goal:05019} [] [] {functional goal:50253} [] []   {gait goal:13606} [] [] {gait goal:24870} [] []   {stair goal:57373} [] [] {stair goal:53395} [] []   {centralize goal:38015} [] [] {centralize goal:98543} [] []   {PS alignment:72387} [] [] {PS alignment:88593} [] []     PLAN OF CARE     To see patient {POC:07069}/week for {weeks:22576} weeks for the following treatment interventions:  {interventions:41917}    TREATMENT Performed this visit :   *** minutes   ? Manual: *** minutes    ? Gait: *** minutes    ? Therapeutic Activity: *** minutes    ? Modalities: *** minutes    ? Therapeutic Exercise: *** minutes    Pt response to Tx:      Plan for next visit:  ?     Thank you for the referral of this patient.       Timed Code Treatment Minutes:  *** minutes     Total Treatment Time:  *** minutes    Medicare Cap total YTD:  $***      Workers Comp Time Stamp  [x]N/A   Time In:   Time Out:    Sushant Lawson PT, DPT, OMT-C #[id number]

## 2021-02-23 ENCOUNTER — HOSPITAL ENCOUNTER (OUTPATIENT)
Dept: PHYSICAL THERAPY | Age: 36
Setting detail: THERAPIES SERIES
Discharge: HOME | End: 2021-02-23
Payer: MEDICAID

## 2021-02-23 NOTE — PROGRESS NOTES
Physical Therapy  Patient arrived 30 minutes late for appointment. Patient will not be seen at this time. Referral from [address]  on 2021. Patient's original referral is from 15 09 2020. Patient has not followed up with Dr Graham Ortiz & is not sure if he will continue to take her as a patient since leaving [facility]. Patient was instructed to find new MD to follow her care. Patient was also advised that she has had 4 no-shows & 2 same day cancels for her initial evaluation, therefore we are not rescheduling her.

## 2021-02-23 NOTE — FLOWSHEET NOTE
Physical Therapy  Cancellation/No-show Note    Patient Name:  Niru Torres  :  1985   Date:  2021  Cancels to Date: 2  No-shows to Date: 4    For today's appointment patient:  []  Cancelled  []  Rescheduled appointment  [x]  No-show     Reason given by patient:  []  Patient ill  []  Conflicting appointment  []  No transportation    []  Conflict with work  []  No reason given  [x]  Other:     Comments:  Pt no show for initial eval. See clerical note.    Electronically signed by:  Felisha Ballard, PT, DPT, OMT-C #[id number]

## 2021-03-09 ENCOUNTER — TELEPHONE (OUTPATIENT)
Dept: INTERNAL MEDICINE CLINIC | Age: 36
End: 2021-03-09

## 2021-03-09 NOTE — TELEPHONE ENCOUNTER
----- Message from Ananda Barragan sent at 3/9/2021  1:55 PM EST -----  Subject: Appointment Request    Reason for Call: Urgent (Patient Request) No Script    QUESTIONS  Type of Appointment? Established Patient  Reason for appointment request? Available appointments did not meet   patient need  Additional Information for Provider? Patient has lump size of quarter on   right side of right breast came up couple of weeks ago hurts cannot wear   bra on it. Pain always at 4 or 5 but if touched at 8. Please call to   schedule appt asap.  ---------------------------------------------------------------------------  --------------  CALL BACK INFO  What is the best way for the office to contact you? OK to leave message on   voicemail  Preferred Call Back Phone Number? [phone]  ---------------------------------------------------------------------------  --------------  SCRIPT ANSWERS  Relationship to Patient? Self  Appointment reason? Symptomatic  Select script based on patient symptoms? Adult No Script   (Is the patient requesting to see the provider for a procedure?)? No  (Is the patient requesting to see the provider urgently  today or   tomorrow. )? Yes  Have you been diagnosed with   tested for   or told that you are suspected of having COVID-19 (Coronavirus)? Yes  Did your symptoms begin or have you been tested for COVID-19 in the last   10 days? No  Have you had a fever or taken medication to treat a fever within the past   3 days? No  Have you had a cough   shortness of breath or flu-like symptoms within the past 3 days? No  Do you currently have flu-like symptoms including fever or chills   cough   shortness of breath   or difficulty breathing   or new loss of taste or smell? No  (Service Expert  click yes below to proceed with [employer] As Usual   Scheduling)?  Yes

## 2021-03-09 NOTE — TELEPHONE ENCOUNTER
----- Message from Graham Phan sent at 3/9/2021  1:49 PM EST -----  Subject: Refill Request    QUESTIONS  Name of Medication? gabapentin (NEURONTIN) 100 MG capsule  Patient-reported dosage and instructions? 1 in morning and 1 in morning  How many days do you have left? 0  Preferred Pharmacy? [address] [place] [address] [address]  Pharmacy phone number (if available)? [phone]  Additional Information for Provider? patient was getting these through   orthopedic but discharge now. Dr De La Cruz said at her last appt that she would   prescribe once she needed them. She has none left. Would like to get them   asap.  ---------------------------------------------------------------------------  --------------  CALL BACK INFO  What is the best way for the office to contact you? OK to leave message on   voicemail  Preferred Call Back Phone Number?  [phone]

## 2021-03-10 PROBLEM — B19.20 HEPATITIS-C: Status: ACTIVE | Noted: 2020-11-01

## 2021-06-12 ENCOUNTER — HOSPITAL ENCOUNTER (EMERGENCY)
Age: 36
Discharge: HOME | End: 2021-06-13
Payer: MEDICAID

## 2021-06-12 DIAGNOSIS — H61.21 IMPACTED CERUMEN OF RIGHT EAR: Primary | ICD-10-CM

## 2021-06-12 PROCEDURE — 99283 EMERGENCY DEPT VISIT LOW MDM: CPT

## 2021-06-12 ASSESSMENT — PAIN SCALES - GENERAL: PAINLEVEL_OUTOF10: 6

## 2021-06-12 ASSESSMENT — PAIN DESCRIPTION - ORIENTATION: ORIENTATION: RIGHT

## 2021-06-12 ASSESSMENT — PAIN DESCRIPTION - PAIN TYPE: TYPE: ACUTE PAIN

## 2021-06-12 ASSESSMENT — PAIN DESCRIPTION - LOCATION: LOCATION: EAR

## 2021-06-13 VITALS
HEART RATE: 82 BPM | WEIGHT: 150 LBS | TEMPERATURE: 98.2 F | RESPIRATION RATE: 16 BRPM | OXYGEN SATURATION: 98 % | DIASTOLIC BLOOD PRESSURE: 78 MMHG | SYSTOLIC BLOOD PRESSURE: 118 MMHG | BODY MASS INDEX: 25.73 KG/M2

## 2021-06-13 PROCEDURE — 6370000000 HC RX 637 (ALT 250 FOR IP): Performed by: NURSE PRACTITIONER

## 2021-06-13 RX ADMIN — DOCUSATE SODIUM 100 MG: 50 LIQUID ORAL at 00:10

## 2021-06-13 NOTE — ED NOTES
Discharge instructions reviewed with patient. Reviewed prescriptions with patient. No additional questions asked. Voiced understanding. Encouraged patient to follow up as discussed by the ED physician.      Farhad Ramirez RN  06/13/21 3750

## 2021-06-13 NOTE — ED NOTES
Irrigated pt's right ear once more with 300 ml of normal saline and and a bulb syringe. Was able to get out some ear wax. Pt tolerated well. Eliz Harris made aware.       Rosalia Shukla  06/13/21 0054

## 2021-06-13 NOTE — ED NOTES
Irrigated pt's right ear with approx 40ml of normal saline and a bulb syringe. Unable to get any earwax out of pt's ear. JEFF Villagran made aware.       Rosalia Shukla  06/12/21 7717

## 2021-06-14 NOTE — ED PROVIDER NOTES
R-2Normal      OXcarbazepine (TRILEPTAL) 300 MG tablet Take 3 tablets by mouth daily, Disp-90 tablet, R-0Normal      nortriptyline (PAMELOR) 50 MG capsule Take 1 capsule by mouth nightly, Disp-30 capsule, R-0Normal      hydrOXYzine (VISTARIL) 50 MG capsule Take 1 capsule by mouth daily, Disp-30 capsule, R-0Normal      aspirin 325 MG tablet Take 325 mg by mouth dailyHistorical Med               ALLERGIES:    Latex, Adhesive tape, and Zithromax [azithromycin]    FAMILY HISTORY:       Family History   Problem Relation Age of Onset    Cancer Mother         throat    Depression Mother     Bipolar Disorder Mother     Other Mother         Fibromyalgia    Cancer Father         mouth     Cancer Maternal Grandfather         brain    Depression Sister     Early Death Brother         Killed himself    Substance Abuse Brother         Heroin abuse    Lung Cancer Paternal Grandmother     Dementia Paternal [de-identified]     Stroke Sister     Substance Abuse Sister         Heroin abuse          SOCIAL HISTORY:     Social History     Socioeconomic History    Marital status: Legally      Spouse name: None    Number of children: None    Years of education: None    Highest education level: None   Occupational History    None   Tobacco Use    Smoking status: Current Every Day Smoker     Packs/day: 0.50     Years: 10.00     Pack years: 5.00     Types: Cigarettes    Smokeless tobacco: Never Used   Vaping Use    Vaping Use: Never used   Substance and Sexual Activity    Alcohol use: No    Drug use: Not Currently     Comment: recovering heroin user     Sexual activity: Yes     Partners: Male   Other Topics Concern    None   Social History Narrative    None     Social Determinants of Health     Financial Resource Strain:     Difficulty of Paying Living Expenses:    Food Insecurity:     Worried About Running Out of Food in the Last Year:     Ran Out of Food in the Last Year:    Transportation Needs:     Lack equina  /ANORECTAL: Not assessed  MUSKULOSKELETAL: Normal ROM. No acute deformities. No edema. SKIN: Warm and dry. NEUROLOGICAL:  GCS 15. No obvious focal neurological deficits. PSYCHIATRIC: Normal affect, normal insight and judgement. Alert and oriented x 3. DIAGNOSTIC RESULTS:     LABS:    No results found for this visit on 06/12/21. RADIOLOGY:  All x-ray studies are viewed/reviewed by me. Formal interpretations per the radiologist are as follows: No orders to display           EKG:  See EKG interpretation by an attending physician. PROCEDURES:   N/A    CRITICAL CARE TIME:   N/A    CONSULTS:  None      EMERGENCY DEPARTMENT COURSE andDIFFERENTIAL DIAGNOSIS/MDM:   Vitals:    Vitals:    06/12/21 2215 06/12/21 2217 06/13/21 0154   BP: 121/80  118/78   Pulse: 89  82   Resp: 14  16   Temp:  98.5 °F (36.9 °C) 98.2 °F (36.8 °C)   TempSrc:  Oral Oral   SpO2: 98%  98%   Weight: 150 lb (68 kg)         Patient wasgiven the following medications:  Medications   docusate (COLACE) 50 MG/5ML liquid 100 mg (100 mg Otic Given 6/13/21 0010)         Patient was evaluated independently by myself with the attending physician available for consultation. Patient presented to the emergency room today with complaints of ear pain. Patient assessment showed a cerumen Gr[place], patient ear was irrigated multiple times, was unable to fully remove impaction. I do think this is the cause of the patient's pain. Patient started on Debrox as an outpatient instructed to follow-up return to the ED for any worsening symptoms. Patient verbalized understanding of plan. No evidence of an acute emergent medical condition at this time. Patient laboratory studies, radiographic imaging, and assessment were all discussed with the patient and/orpatient family. There was shared decision-making between myself as well as the patient and/or their surrogate and we are all in agreement with discharge home.   There was an opportunity for questions and all questions were answered tothe best of my ability and to the satisfaction of the patient and/or patient family. FINAL IMPRESSION:      1.  Impacted cerumen of right ear          DISPOSITION/PLAN:   DISPOSITION Decision To Discharge      PATIENT REFERRED TO:  Clementina Bunn MD  [address] [place] [phone]  [phone]    Call   For follow up      DISCHARGE MEDICATIONS:  Discharge Medication List as of 6/13/2021  1:21 AM      START taking these medications    Details   carbamide peroxide (DEBROX) 6.5 % otic solution Place 5 drops in ear(s) 2 times daily, Disp-15 mL, R-0Normal                        (Please note thatportions of this note were completed with a voice recognition program.  Efforts were made to edit the dictations, but occasionally words are mis-transcribed.)    TAB Good - CNP-C (electronicallysigned)     TAB Good - MARIA VICTORIA  06/14/21 5636

## 2021-09-09 ENCOUNTER — HOSPITAL ENCOUNTER (EMERGENCY)
Age: 36
Discharge: HOME | End: 2021-09-09
Payer: MEDICAID

## 2021-09-09 VITALS
TEMPERATURE: 98.5 F | SYSTOLIC BLOOD PRESSURE: 112 MMHG | WEIGHT: 150 LBS | OXYGEN SATURATION: 98 % | DIASTOLIC BLOOD PRESSURE: 70 MMHG | BODY MASS INDEX: 25.73 KG/M2 | RESPIRATION RATE: 18 BRPM | HEART RATE: 90 BPM

## 2021-09-09 DIAGNOSIS — R50.9 FEVER AND CHILLS: ICD-10-CM

## 2021-09-09 DIAGNOSIS — Z20.822 COVID-19 VIRUS TEST RESULT UNKNOWN: ICD-10-CM

## 2021-09-09 DIAGNOSIS — J02.9 ACUTE PHARYNGITIS, UNSPECIFIED ETIOLOGY: Primary | ICD-10-CM

## 2021-09-09 LAB — S PYO AG THROAT QL: NEGATIVE

## 2021-09-09 PROCEDURE — 87081 CULTURE SCREEN ONLY: CPT

## 2021-09-09 PROCEDURE — 87880 STREP A ASSAY W/OPTIC: CPT

## 2021-09-09 PROCEDURE — U0003 INFECTIOUS AGENT DETECTION BY NUCLEIC ACID (DNA OR RNA); SEVERE ACUTE RESPIRATORY SYNDROME CORONAVIRUS 2 (SARS-COV-2) (CORONAVIRUS DISEASE [COVID-19]), AMPLIFIED PROBE TECHNIQUE, MAKING USE OF HIGH THROUGHPUT TECHNOLOGIES AS DESCRIBED BY CMS-2020-01-R: HCPCS

## 2021-09-09 PROCEDURE — 6370000000 HC RX 637 (ALT 250 FOR IP): Performed by: PHYSICIAN ASSISTANT

## 2021-09-09 PROCEDURE — 6360000002 HC RX W HCPCS: Performed by: PHYSICIAN ASSISTANT

## 2021-09-09 PROCEDURE — U0005 INFEC AGEN DETEC AMPLI PROBE: HCPCS

## 2021-09-09 PROCEDURE — 99283 EMERGENCY DEPT VISIT LOW MDM: CPT

## 2021-09-09 RX ORDER — DEXAMETHASONE 4 MG/1
TABLET ORAL
Status: DISPENSED
Start: 2021-09-09 | End: 2021-09-10

## 2021-09-09 RX ORDER — DEXAMETHASONE 4 MG/1
10 TABLET ORAL ONCE
Status: COMPLETED | OUTPATIENT
Start: 2021-09-09 | End: 2021-09-09

## 2021-09-09 RX ORDER — ASPIRIN 325 MG
325 TABLET ORAL ONCE
Status: COMPLETED | OUTPATIENT
Start: 2021-09-09 | End: 2021-09-09

## 2021-09-09 RX ORDER — ASPIRIN 325 MG
TABLET ORAL
Status: DISPENSED
Start: 2021-09-09 | End: 2021-09-10

## 2021-09-09 RX ORDER — ASPIRIN 81 MG/1
TABLET, CHEWABLE ORAL
Qty: 60 TABLET | Refills: 0 | Status: SHIPPED | OUTPATIENT
Start: 2021-09-09

## 2021-09-09 RX ADMIN — ASPIRIN 325 MG: 325 TABLET ORAL at 22:49

## 2021-09-09 RX ADMIN — DEXAMETHASONE 10 MG: 4 TABLET ORAL at 22:48

## 2021-09-09 ASSESSMENT — PAIN DESCRIPTION - PAIN TYPE: TYPE: ACUTE PAIN

## 2021-09-09 ASSESSMENT — PAIN SCALES - GENERAL: PAINLEVEL_OUTOF10: 6

## 2021-09-09 ASSESSMENT — PAIN DESCRIPTION - LOCATION: LOCATION: THROAT

## 2021-09-10 ENCOUNTER — CARE COORDINATION (OUTPATIENT)
Dept: CARE COORDINATION | Age: 36
End: 2021-09-10

## 2021-09-10 LAB — SARS-COV-2: NOT DETECTED

## 2021-09-10 NOTE — ED PROVIDER NOTES
[facility] Emergency Department    CHIEF COMPLAINT  Pharyngitis (states for 24 hours, ), Fever, and Generalized Body Aches      SHARED SERVICE VISIT  Evaluated by HARSHAD. My supervising physician was available for consultation. HISTORY OF PRESENT ILLNESS  Margo Freitas is a 39 y.o. female with a past medical history of anxiety, bipolar, hepatitis C, and multiple personality disorder who presents to the ED complaining of fever, pharyngitis, generalized body aches. The patient states her symptoms began approximately 24 hours ago. Currently she rates her pain as a 6/10 describing as acute and in her throat worse with swallowing. She does have a mild cough that is nonproductive. She denies any shortness of breath or chest pain. She has not taken anything for her symptoms. No other complaints, modifying factors or associated symptoms. Nursing notes reviewed.    Past Medical History:   Diagnosis Date    Anxiety     [facility]     Hepatitis C     Hepatitis C    Mood disorder ([facility]     Multiple personality disorder ([facility]      Past Surgical History:   Procedure Laterality Date    FIBULA FRACTURE SURGERY Right 10/15/2020    OPEN REDUCTION INTERNAL FIXATION RIGHT FIBULA FRACTURE, POSSIBLE SYNDESMOTIC LIGAMENT REPAIR -POSSIBLE BLOCK- performed by Sherry Kingston MD at [facility] Bilateral     TUBAL LIGATION  2006     Family History   Problem Relation Age of Onset   Denise Solum Cancer Mother         throat    Depression Mother     Bipolar Disorder Mother     Other Mother         Fibromyalgia    Cancer Father         mouth     Cancer Maternal Grandfather         brain    Depression Sister     Early Death Brother         Killed himself    Substance Abuse Brother         Heroin abuse    Lung Cancer Paternal Grandmother     Dementia Paternal Rosezena Shaggy     Stroke Sister     Substance Abuse Sister         Heroin abuse     Social History     Socioeconomic History    Marital status: Legally      Spouse name: Not on file    Number of children: Not on file    Years of education: Not on file    Highest education level: Not on file   Occupational History    Not on file   Tobacco Use    Smoking status: Current Every Day Smoker     Packs/day: 0.50     Years: 10.00     Pack years: 5.00     Types: Cigarettes    Smokeless tobacco: Never Used   Vaping Use    Vaping Use: Never used   Substance and Sexual Activity    Alcohol use: No    Drug use: Not Currently     Comment: recovering heroin user     Sexual activity: Yes     Partners: Male   Other Topics Concern    Not on file   Social History Narrative    Not on file     Social Determinants of Health     Financial Resource Strain:     Difficulty of Paying Living Expenses:    Food Insecurity:     Worried About Running Out of Food in the Last Year:     [address] in the Last Year:    Transportation Needs:     Lack of Transportation (Medical):      Lack of Transportation (Non-Medical):    Physical Activity:     Days of Exercise per Week:     Minutes of Exercise per Session:    Stress:     Feeling of Stress :    Social Connections:     Frequency of Communication with Friends and Family:     Frequency of Social Gatherings with Friends and Family:     Attends Che[employer] Services:     Active Member of Clubs or Organizations:     Attends Club or Organization Meetings:     Marital Status:    Intimate Partner Violence:     Fear of Current or Ex-Partner:     Emotionally Abused:     Physically Abused:     Sexually Abused:      Current Facility-Administered Medications   Medication Dose Route Frequency Provider Last Rate Last Admin    aspirin tablet 325 mg  325 mg Oral Once [employer], [place]        dexamethasone (DECADRON) tablet 10 mg  10 mg Oral Once [employer], [place]         Current Outpatient Medications   Medication Sig Dispense Refill    gabapentin (NEURONTIN) 100 MG capsule Take 1 capsule by mouth 2 times daily for 90 days. 60 capsule 2    OXcarbazepine (TRILEPTAL) 300 MG tablet Take 3 tablets by mouth daily 90 tablet 0    nortriptyline (PAMELOR) 50 MG capsule Take 1 capsule by mouth nightly 30 capsule 0    hydrOXYzine (VISTARIL) 50 MG capsule Take 1 capsule by mouth daily 30 capsule 0    aspirin 325 MG tablet Take 325 mg by mouth daily       Allergies   Allergen Reactions    Latex     Adhesive Tape     Zithromax [Azithromycin] Rash       REVIEW OF SYSTEMS  10 systems reviewed, pertinent positives per HPI otherwise noted to be negative    PHYSICAL EXAM  /74   Pulse 93   Temp 98.9 °F (37.2 °C) (Oral)   Resp 18   Wt 150 lb (68 kg)   LMP 08/11/2021   SpO2 97%   BMI 25.73 kg/m²   GENERAL APPEARANCE: Awake and alert. Cooperative. No acute distress. HEAD: Normocephalic. Atraumatic. EYES: PERRL. EOM's grossly intact. ENT: Mucous membranes are moist.   NECK: Supple. HEART: RRR. No murmurs. Rubs or gallops  LUNGS: Respirations unlabored. CTAB. Good air exchange. Speaking comfortably in full sentences. No wheezes rales or rhonchi  ABDOMEN: Soft. Non-distended. Non-tender. No guarding or rebound. No masses. No organomegaly. EXTREMITIES: No peripheral edema. Moves all extremities equally. All extremities neurovascularly intact. SKIN: Warm and dry. No acute rashes. NEUROLOGICAL: Alert and oriented. CN's 2-12 intact. No gross facial drooping. Strength 5/5, sensation intact. PSYCHIATRIC: Normal mood and affect. RADIOLOGY  No results found. ED COURSE  Patient was seen in the emergency department today for multiple complaints. Triage vitals are stable, pulse of 90, she is afebrile. Strep test is negative culture pending. Covid pending. Patient is given aspirin, Decadron while in the emergency department. A discussion was had with MsLaura  Terry Barba regarding strep results, she is given a work note until her Covid test results or if positive she will need to quarantine for 10 days from symptom onset. She is given Covid isolation restrictions and return precautions. Risk management discussed and shared decision making had with patient and/or surrogate. All questions were answered. Patient will follow up with PCP for further evaluation/treatment. All questions answered. Patient will return to ED for new/worsening symptoms. MDM  Results for orders placed or performed during the hospital encounter of 09/09/21   Strep Screen Group A Throat    Specimen: Throat   Result Value Ref Range    Rapid Strep A Screen Negative Negative   Culture, Beta Strep Confirm Plates    Specimen: Throat   Result Value Ref Range    Strep A Culture Further report to follow    COVID-19   Result Value Ref Range    SARS-CoV-2 Not Detected Not detected       I estimate there is LOW risk for PULMONARY EMBOLISM, ACUTE CORONARY SYNDROME, OR THORACIC AORTIC DISSECTION, thus I consider the discharge disposition reasonable. Courtney Bennett and I have discussed the diagnosis and risks, and we agree with discharging home to follow-up with their primary doctor. We also discussed returning to the Emergency Department immediately if new or worsening symptoms occur. We have discussed the symptoms which are most concerning (e.g., bloody sputum, fever, worsening pain or shortness of breath, vomiting) that necessitate immediate return. FINAL Impression    1. Acute pharyngitis, unspecified etiology    2. Fever and chills    3. COVID-19 virus test result unknown        Blood pressure 112/70, pulse 90, temperature 98.5 °F (36.9 °C), resp. rate 18, weight 150 lb (68 kg), last menstrual period 08/11/2021, SpO2 98 %, not currently breastfeeding. DISPOSITION  Patient was discharged to home in good condition.           [place], [place]  09/11/21 0155

## 2021-09-11 LAB — S PYO THROAT QL CULT: NORMAL

## 2021-09-17 ENCOUNTER — HOSPITAL ENCOUNTER (EMERGENCY)
Age: 36
Discharge: HOME | End: 2021-09-18
Payer: MEDICAID

## 2021-09-17 DIAGNOSIS — U07.1 COVID-19 VIRUS INFECTION: Primary | ICD-10-CM

## 2021-09-17 PROCEDURE — 99284 EMERGENCY DEPT VISIT MOD MDM: CPT

## 2021-09-17 ASSESSMENT — PAIN SCALES - GENERAL: PAINLEVEL_OUTOF10: 3

## 2021-09-18 ENCOUNTER — APPOINTMENT (OUTPATIENT)
Dept: GENERAL RADIOLOGY | Age: 36
End: 2021-09-18
Payer: MEDICAID

## 2021-09-18 VITALS
DIASTOLIC BLOOD PRESSURE: 68 MMHG | HEIGHT: 64 IN | HEART RATE: 76 BPM | OXYGEN SATURATION: 100 % | TEMPERATURE: 98.2 F | RESPIRATION RATE: 16 BRPM | BODY MASS INDEX: 27.31 KG/M2 | WEIGHT: 160 LBS | SYSTOLIC BLOOD PRESSURE: 103 MMHG

## 2021-09-18 LAB
A/G RATIO: 1.1 (ref 1.1–2.2)
ALBUMIN SERPL-MCNC: 4 G/DL (ref 3.4–5)
ALP BLD-CCNC: 117 U/L (ref 40–129)
ALT SERPL-CCNC: 24 U/L (ref 10–40)
AMPHETAMINE SCREEN, URINE: POSITIVE
ANION GAP SERPL CALCULATED.3IONS-SCNC: 8 MMOL/L (ref 3–16)
AST SERPL-CCNC: 25 U/L (ref 15–37)
BACTERIA: ABNORMAL /HPF
BARBITURATE SCREEN URINE: ABNORMAL
BASOPHILS ABSOLUTE: 0 K/UL (ref 0–0.2)
BASOPHILS RELATIVE PERCENT: 0.7 %
BENZODIAZEPINE SCREEN, URINE: ABNORMAL
BILIRUB SERPL-MCNC: <0.2 MG/DL (ref 0–1)
BILIRUBIN URINE: NEGATIVE
BLOOD, URINE: NEGATIVE
BUN BLDV-MCNC: 15 MG/DL (ref 7–20)
CALCIUM SERPL-MCNC: 8.9 MG/DL (ref 8.3–10.6)
CANNABINOID SCREEN URINE: ABNORMAL
CHLORIDE BLD-SCNC: 102 MMOL/L (ref 99–110)
CLARITY: CLEAR
CO2: 26 MMOL/L (ref 21–32)
COCAINE METABOLITE SCREEN URINE: ABNORMAL
COLOR: YELLOW
CREAT SERPL-MCNC: 0.7 MG/DL (ref 0.6–1.1)
EOSINOPHILS ABSOLUTE: 0.2 K/UL (ref 0–0.6)
EOSINOPHILS RELATIVE PERCENT: 4.3 %
EPITHELIAL CELLS, UA: ABNORMAL /HPF (ref 0–5)
GFR AFRICAN AMERICAN: >60
GFR NON-AFRICAN AMERICAN: >60
GLOBULIN: 3.7 G/DL
GLUCOSE BLD-MCNC: 129 MG/DL (ref 70–99)
GLUCOSE URINE: NEGATIVE MG/DL
HCG QUALITATIVE: NEGATIVE
HCT VFR BLD CALC: 28.5 % (ref 36–48)
HEMATOLOGY PATH CONSULT: YES
HEMOGLOBIN: 8.8 G/DL (ref 12–16)
IRON SATURATION: 5 % (ref 15–50)
IRON: 18 UG/DL (ref 37–145)
KETONES, URINE: NEGATIVE MG/DL
LEUKOCYTE ESTERASE, URINE: NEGATIVE
LYMPHOCYTES ABSOLUTE: 1.5 K/UL (ref 1–5.1)
LYMPHOCYTES RELATIVE PERCENT: 28.4 %
Lab: ABNORMAL
MCH RBC QN AUTO: 20.9 PG (ref 26–34)
MCHC RBC AUTO-ENTMCNC: 30.9 G/DL (ref 31–36)
MCV RBC AUTO: 67.5 FL (ref 80–100)
METHADONE SCREEN, URINE: POSITIVE
MICROCYTES: ABNORMAL
MICROSCOPIC EXAMINATION: YES
MONOCYTES ABSOLUTE: 0.5 K/UL (ref 0–1.3)
MONOCYTES RELATIVE PERCENT: 10.3 %
MUCUS: ABNORMAL /LPF
NEUTROPHILS ABSOLUTE: 2.9 K/UL (ref 1.7–7.7)
NEUTROPHILS RELATIVE PERCENT: 56.3 %
NITRITE, URINE: NEGATIVE
OPIATE SCREEN URINE: ABNORMAL
OXYCODONE URINE: ABNORMAL
PDW BLD-RTO: 16.1 % (ref 12.4–15.4)
PH UA: 5.5
PH UA: 5.5 (ref 5–8)
PHENCYCLIDINE SCREEN URINE: ABNORMAL
PLATELET # BLD: 337 K/UL (ref 135–450)
PMV BLD AUTO: 6.5 FL (ref 5–10.5)
POTASSIUM REFLEX MAGNESIUM: 3.8 MMOL/L (ref 3.5–5.1)
PROPOXYPHENE SCREEN: ABNORMAL
PROTEIN UA: 30 MG/DL
RBC # BLD: 4.22 M/UL (ref 4–5.2)
RBC UA: ABNORMAL /HPF (ref 0–4)
SARS-COV-2, NAAT: DETECTED
SLIDE REVIEW: ABNORMAL
SODIUM BLD-SCNC: 136 MMOL/L (ref 136–145)
SPECIFIC GRAVITY UA: >=1.03 (ref 1–1.03)
SPHEROCYTES: ABNORMAL
TOTAL IRON BINDING CAPACITY: 352 UG/DL (ref 260–445)
TOTAL PROTEIN: 7.7 G/DL (ref 6.4–8.2)
URINE REFLEX TO CULTURE: ABNORMAL
URINE TYPE: ABNORMAL
UROBILINOGEN, URINE: 0.2 E.U./DL
WBC # BLD: 5.1 K/UL (ref 4–11)
WBC UA: ABNORMAL /HPF (ref 0–5)

## 2021-09-18 PROCEDURE — 83540 ASSAY OF IRON: CPT

## 2021-09-18 PROCEDURE — 87635 SARS-COV-2 COVID-19 AMP PRB: CPT

## 2021-09-18 PROCEDURE — 6360000002 HC RX W HCPCS: Performed by: PHYSICIAN ASSISTANT

## 2021-09-18 PROCEDURE — 80053 COMPREHEN METABOLIC PANEL: CPT

## 2021-09-18 PROCEDURE — 85025 COMPLETE CBC W/AUTO DIFF WBC: CPT

## 2021-09-18 PROCEDURE — 2580000003 HC RX 258: Performed by: PHYSICIAN ASSISTANT

## 2021-09-18 PROCEDURE — 84703 CHORIONIC GONADOTROPIN ASSAY: CPT

## 2021-09-18 PROCEDURE — 71045 X-RAY EXAM CHEST 1 VIEW: CPT

## 2021-09-18 PROCEDURE — 83550 IRON BINDING TEST: CPT

## 2021-09-18 PROCEDURE — 96375 TX/PRO/DX INJ NEW DRUG ADDON: CPT

## 2021-09-18 PROCEDURE — 80307 DRUG TEST PRSMV CHEM ANLYZR: CPT

## 2021-09-18 PROCEDURE — 96374 THER/PROPH/DIAG INJ IV PUSH: CPT

## 2021-09-18 PROCEDURE — 81001 URINALYSIS AUTO W/SCOPE: CPT

## 2021-09-18 RX ORDER — DEXAMETHASONE 6 MG/1
6 TABLET ORAL DAILY
Qty: 6 TABLET | Refills: 0 | Status: SHIPPED | OUTPATIENT
Start: 2021-09-18 | End: 2021-09-24

## 2021-09-18 RX ORDER — KETOROLAC TROMETHAMINE 30 MG/ML
15 INJECTION, SOLUTION INTRAMUSCULAR; INTRAVENOUS ONCE
Status: COMPLETED | OUTPATIENT
Start: 2021-09-18 | End: 2021-09-18

## 2021-09-18 RX ORDER — METOCLOPRAMIDE HYDROCHLORIDE 5 MG/ML
10 INJECTION INTRAMUSCULAR; INTRAVENOUS ONCE
Status: COMPLETED | OUTPATIENT
Start: 2021-09-18 | End: 2021-09-18

## 2021-09-18 RX ORDER — 0.9 % SODIUM CHLORIDE 0.9 %
1000 INTRAVENOUS SOLUTION INTRAVENOUS ONCE
Status: COMPLETED | OUTPATIENT
Start: 2021-09-18 | End: 2021-09-18

## 2021-09-18 RX ADMIN — METOCLOPRAMIDE HYDROCHLORIDE 10 MG: 5 INJECTION INTRAMUSCULAR; INTRAVENOUS at 00:38

## 2021-09-18 RX ADMIN — KETOROLAC TROMETHAMINE 15 MG: 30 INJECTION, SOLUTION INTRAMUSCULAR at 00:39

## 2021-09-18 RX ADMIN — SODIUM CHLORIDE 1000 ML: 9 INJECTION, SOLUTION INTRAVENOUS at 00:38

## 2021-09-18 ASSESSMENT — PAIN SCALES - GENERAL: PAINLEVEL_OUTOF10: 4

## 2021-09-18 NOTE — ED PROVIDER NOTES
REDUCTION INTERNAL FIXATION RIGHT FIBULA FRACTURE, POSSIBLE SYNDESMOTIC LIGAMENT REPAIR -POSSIBLE BLOCK- performed by Khris Ko MD at [facility] Bilateral     TUBAL LIGATION  2006         [address]       Discharge Medication List as of 9/18/2021  2:42 AM      CONTINUE these medications which have NOT CHANGED    Details   aspirin (ASPIRIN CHILDRENS) 81 MG chewable tablet 2 tablets by mouth daily for 21   days then 1 tablet daily after, Disp-60 tablet, R-0Print      gabapentin (NEURONTIN) 100 MG capsule Take 1 capsule by mouth 2 times daily for 90 days. , Disp-60 capsule, R-2Normal      OXcarbazepine (TRILEPTAL) 300 MG tablet Take 3 tablets by mouth daily, Disp-90 tablet, R-0Normal      nortriptyline (PAMELOR) 50 MG capsule Take 1 capsule by mouth nightly, Disp-30 capsule, R-0Normal      hydrOXYzine (VISTARIL) 50 MG capsule Take 1 capsule by mouth daily, Disp-30 capsule, R-0Normal               ALLERGIES     Latex, Adhesive tape, and Zithromax [azithromycin]    FAMILYHISTORY       Family History   Problem Relation Age of Onset    Cancer Mother         throat    Depression Mother     Bipolar Disorder Mother     Other Mother         Fibromyalgia    Cancer Father         mouth     Cancer Maternal Grandfather         brain    Depression Sister     Early Death Brother         Killed himself    Substance Abuse Brother         Heroin abuse    Lung Cancer Paternal Grandmother     Dementia Paternal [de-identified]     Stroke Sister     Substance Abuse Sister         Heroin abuse          SOCIAL HISTORY       Social History     Tobacco Use    Smoking status: Current Every Day Smoker     Packs/day: 0.50     Years: 10.00     Pack years: 5.00     Types: Cigarettes    Smokeless tobacco: Never Used   Vaping Use    Vaping Use: Never used   Substance Use Topics    Alcohol use: No    Drug use: Not Currently     Comment: recovering heroin user        SCREENINGS    [place] Coma Scale  Eye Opening: Spontaneous  Best Verbal Response: Oriented  Best Motor Response: Obeys commands  Julianne Coma Scale Score: 15        PHYSICAL EXAM    (up to 7 for level 4, 8 or more for level 5)     ED Triage Vitals [09/17/21 2334]   BP Temp Temp Source Pulse Resp SpO2 Height Weight   122/72 98.2 °F (36.8 °C) Oral 86 16 99 % 5' 4\" (1.626 m) 160 lb (72.6 kg)       Physical Exam  Vitals and nursing note reviewed. Constitutional:       Appearance: Normal appearance. She is well-developed and normal weight. HENT:      Head: Normocephalic and atraumatic. Right Ear: External ear normal.      Left Ear: External ear normal.      Nose: Nose normal.   Eyes:      General: No scleral icterus. Right eye: No discharge. Left eye: No discharge. Conjunctiva/sclera: Conjunctivae normal.   Cardiovascular:      Rate and Rhythm: Normal rate and regular rhythm. Heart sounds: Normal heart sounds. Pulmonary:      Effort: Pulmonary effort is normal.      Breath sounds: Normal breath sounds. Abdominal:      General: Abdomen is flat. Bowel sounds are normal.      Palpations: Abdomen is soft. Tenderness: There is no abdominal tenderness. Musculoskeletal:         General: Normal range of motion. Cervical back: Normal range of motion and neck supple. No rigidity or tenderness. Skin:     General: Skin is warm and dry. Neurological:      General: No focal deficit present. Mental Status: She is alert and oriented to person, place, and time. Mental status is at baseline. Psychiatric:         Mood and Affect: Mood normal.         Behavior: Behavior normal.         Thought Content: Thought content normal.         Judgment: Judgment normal.         DIAGNOSTIC RESULTS   LABS:    Labs Reviewed   COVID-19, RAPID - Abnormal; Notable for the following components:       Result Value    SARS-CoV-2, NAAT DETECTED (*)     All other components within normal limits    Narrative:     CALL  Cyr  SAED tel. [phone],  Microbiology results called to and read back by Trev SANDOVAL, 09/18/2021  01:09, by Yvonne Rice  Performed at:  [address], [facility] "[employer]   Phone [phone]   CBC WITH AUTO DIFFERENTIAL - Abnormal; Notable for the following components:    Hemoglobin 8.8 (*)     Hematocrit 28.5 (*)     MCV 67.5 (*)     MCH 20.9 (*)     MCHC 30.9 (*)     RDW 16.1 (*)     Microcytes 2+ (*)     Spherocytes 2+ (*)     All other components within normal limits    Narrative:     Performed at:  [place] [address] "[employer]   Phone [phone]   COMPREHENSIVE METABOLIC PANEL W/ REFLEX TO MG FOR LOW K - Abnormal; Notable for the following components:    Glucose 129 (*)     All other components within normal limits    Narrative:     Performed at:  [place] [address] "[employer]   Phone [phone]   URINE RT REFLEX TO CULTURE - Abnormal; Notable for the following components:    Protein, UA 30 (*)     All other components within normal limits    Narrative:     Performed at:  [place] [address] "[employer]   Phone [phone]   [address] - Abnormal; Notable for the following components:    Amphetamine Screen, Urine POSITIVE (*)     Methadone Screen, Urine POSITIVE (*)     All other components within normal limits    Narrative:     Performed at:  [place] [address] "[employer]   Phone [phone]   MICROSCOPIC URINALYSIS - Abnormal; Notable for the following components:    Mucus, UA Rare (*)     WBC, UA 6-9 (*)     Bacteria, UA 1+ (*)     All other components within normal limits    Narrative:     Performed at:  [place] [address] "[employer]   Phone [phone]   IRON AND TIBC - Abnormal; Notable for the following components:    Iron 18 (*)     Iron Saturation 5 (*)     All other components within normal limits    Narrative:     Performed at:  [facility]  [address] [facility] [address]   Phone [phone]   HCG, SERUM, QUALITATIVE    Narrative:     Performed at:  [facility]) [address], [address] [place] [address]   Phone [phone]       When ordered only abnormal lab results are displayed. All other labs were within normal range or not returned as of this dictation. EKG: When ordered, EKG's are interpreted by the Emergency Department Physician in the absence of a cardiologist.  Please see their note for interpretation of EKG. RADIOLOGY:   Non-plain film images such as CT, Ultrasound and MRI are read by the radiologist. Plain radiographic images are visualized and preliminarily interpreted by the ED Provider with the below findings:        Interpretation per the Radiologist below, if available at the time of this note:    XR CHEST PORTABLE   Final Result   No evidence of acute cardiopulmonary disease           XR CHEST PORTABLE    Result Date: 9/18/2021  EXAMINATION: ONE XRAY VIEW OF THE CHEST 9/17/2021 6:29 pm COMPARISON: February 20, 2008 HISTORY: ORDERING SYSTEM PROVIDED HISTORY: cough TECHNOLOGIST PROVIDED HISTORY: Reason for exam:->cough Reason for Exam: cough Acuity: Unknown Type of Exam: Unknown FINDINGS: Marginal inspiration is present. No focal area of consolidation or pneumothorax is noted. Heart size and mediastinal contours are normal.  Old fracture deformity is present involving the posterior right 7th rib, and anterolateral left 6th rib. .  No acute osseous abnormality is present. .     No evidence of acute cardiopulmonary disease           PROCEDURES   Unless otherwise noted below, none     Procedures    CRITICAL CARE TIME   N/A    CONSULTS:  None      EMERGENCY DEPARTMENT COURSE and DIFFERENTIAL DISCONTINUED MEDICATIONS:  Discharge Medication List as of 9/18/2021  2:42 AM                 (Please note that portions of this note were completed with a voice recognition program.  Efforts were made to edit the dictations but occasionally words are mis-transcribed. )    Diana Naranjo PA-C (electronically signed)           Diana Naranjo PA-C  09/19/21 2876

## 2021-09-20 LAB — HEMATOLOGY PATH CONSULT: NORMAL

## 2021-09-29 ENCOUNTER — HOSPITAL ENCOUNTER (EMERGENCY)
Age: 36
Discharge: LEFT BEFORE DISCHARGE | End: 2021-09-29
Payer: MEDICAID
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
## 2023-08-04 ENCOUNTER — HOSPITAL ENCOUNTER (EMERGENCY)
Age: 38
Discharge: HOME | End: 2023-08-04
Attending: STUDENT IN AN ORGANIZED HEALTH CARE EDUCATION/TRAINING PROGRAM
Payer: COMMERCIAL

## 2023-08-04 VITALS
SYSTOLIC BLOOD PRESSURE: 123 MMHG | HEART RATE: 64 BPM | OXYGEN SATURATION: 100 % | BODY MASS INDEX: 27.7 KG/M2 | WEIGHT: 156.31 LBS | RESPIRATION RATE: 13 BRPM | HEIGHT: 63 IN | DIASTOLIC BLOOD PRESSURE: 52 MMHG | TEMPERATURE: 98.4 F

## 2023-08-04 DIAGNOSIS — R42 LIGHTHEADEDNESS: Primary | ICD-10-CM

## 2023-08-04 DIAGNOSIS — E87.1 HYPONATREMIA: ICD-10-CM

## 2023-08-04 LAB
ANION GAP SERPL CALCULATED.3IONS-SCNC: 10 MMOL/L (ref 3–16)
BASOPHILS # BLD: 0 K/UL (ref 0–0.2)
BASOPHILS NFR BLD: 0.8 %
BUN SERPL-MCNC: 13 MG/DL (ref 7–20)
CALCIUM SERPL-MCNC: 9.1 MG/DL (ref 8.3–10.6)
CHLORIDE SERPL-SCNC: 94 MMOL/L (ref 99–110)
CO2 SERPL-SCNC: 22 MMOL/L (ref 21–32)
CREAT SERPL-MCNC: 0.6 MG/DL (ref 0.6–1.1)
DEPRECATED RDW RBC AUTO: 17.1 % (ref 12.4–15.4)
EOSINOPHIL # BLD: 0.2 K/UL (ref 0–0.6)
EOSINOPHIL NFR BLD: 2.8 %
GFR SERPLBLD CREATININE-BSD FMLA CKD-EPI: >60 ML/MIN/{1.73_M2}
GLUCOSE SERPL-MCNC: 83 MG/DL (ref 70–99)
HCG SERPL QL: NEGATIVE
HCT VFR BLD AUTO: 30.4 % (ref 36–48)
HGB BLD-MCNC: 10.4 G/DL (ref 12–16)
LYMPHOCYTES # BLD: 1.9 K/UL (ref 1–5.1)
LYMPHOCYTES NFR BLD: 28.6 %
MAGNESIUM SERPL-MCNC: 1.8 MG/DL (ref 1.8–2.4)
MCH RBC QN AUTO: 26.9 PG (ref 26–34)
MCHC RBC AUTO-ENTMCNC: 34.2 G/DL (ref 31–36)
MCV RBC AUTO: 78.8 FL (ref 80–100)
MONOCYTES # BLD: 0.4 K/UL (ref 0–1.3)
MONOCYTES NFR BLD: 6.1 %
NEUTROPHILS # BLD: 4 K/UL (ref 1.7–7.7)
NEUTROPHILS NFR BLD: 61.7 %
PLATELET # BLD AUTO: 320 K/UL (ref 135–450)
PMV BLD AUTO: 6.9 FL (ref 5–10.5)
POTASSIUM SERPL-SCNC: 3.6 MMOL/L (ref 3.5–5.1)
RBC # BLD AUTO: 3.86 M/UL (ref 4–5.2)
SODIUM SERPL-SCNC: 126 MMOL/L (ref 136–145)
TROPONIN, HIGH SENSITIVITY: 7 NG/L (ref 0–14)
TROPONIN, HIGH SENSITIVITY: <6 NG/L (ref 0–14)
WBC # BLD AUTO: 6.5 K/UL (ref 4–11)

## 2023-08-04 PROCEDURE — 80048 BASIC METABOLIC PNL TOTAL CA: CPT

## 2023-08-04 PROCEDURE — 84484 ASSAY OF TROPONIN QUANT: CPT

## 2023-08-04 PROCEDURE — 2580000003 HC RX 258: Performed by: STUDENT IN AN ORGANIZED HEALTH CARE EDUCATION/TRAINING PROGRAM

## 2023-08-04 PROCEDURE — 36415 COLL VENOUS BLD VENIPUNCTURE: CPT

## 2023-08-04 PROCEDURE — 85025 COMPLETE CBC W/AUTO DIFF WBC: CPT

## 2023-08-04 PROCEDURE — 83735 ASSAY OF MAGNESIUM: CPT

## 2023-08-04 PROCEDURE — 99284 EMERGENCY DEPT VISIT MOD MDM: CPT

## 2023-08-04 PROCEDURE — 93005 ELECTROCARDIOGRAM TRACING: CPT | Performed by: STUDENT IN AN ORGANIZED HEALTH CARE EDUCATION/TRAINING PROGRAM

## 2023-08-04 PROCEDURE — 84703 CHORIONIC GONADOTROPIN ASSAY: CPT

## 2023-08-04 RX ORDER — 0.9 % SODIUM CHLORIDE 0.9 %
1000 INTRAVENOUS SOLUTION INTRAVENOUS ONCE
Status: COMPLETED | OUTPATIENT
Start: 2023-08-04 | End: 2023-08-04

## 2023-08-04 RX ADMIN — SODIUM CHLORIDE 1000 ML: 9 INJECTION, SOLUTION INTRAVENOUS at 18:49

## 2023-08-04 ASSESSMENT — PAIN - FUNCTIONAL ASSESSMENT
PAIN_FUNCTIONAL_ASSESSMENT: NONE - DENIES PAIN
PAIN_FUNCTIONAL_ASSESSMENT: NONE - DENIES PAIN

## 2023-08-04 NOTE — ED PROVIDER NOTES
[facility]      EMERGENCY MEDICINE     Pt Name: Gricelda Montemayor  MRN: [id number]  [address] [facility] 1985  Date of evaluation: 8/4/2023  Provider: Babak Perales MD    [facility]       Chief Complaint   Patient presents with    Dizziness     C/o dizziness on and off for 2 months. C/o dizziness every day for 1 week. States episodes come and go. HISTORY OF PRESENT ILLNESS   Gricelda Montemayor is a 45 y.o. female who presents to the emergency department for Lightheadedness/dizziness she states over the last 2 months is waxing and waning. Some of the episodes become more frequent in the last week. She had an episode of syncope and collapse that occurred yesterday. She went from a sitting position to a standing position began walking abnormal symptoms of vision narrowing clamminess and collapse. On carpeted floor. She denies any fever chills headaches vision changes. Denies any neck pain rashes.            PASTMEDICAL HISTORY     Past Medical History:   Diagnosis Date    Anxiety     Bipolar disorder ([facility])     COVID-19 09/18/2021    Hepatitis C     Hepatitis C    Mood disorder ([facility])     Multiple personality disorder [facility])        Patient Active Problem List   Diagnosis Code    Closed fracture of right distal fibula S82.831A    Bipolar 1 disorder, mixed, full remission ([facility]) F31.78    Neuralgia of upper extremity M79.2    Hepatitis-C B19.20     SURGICAL HISTORY       Past Surgical History:   Procedure Laterality Date    FIBULA FRACTURE SURGERY Right 10/15/2020    OPEN REDUCTION INTERNAL FIXATION RIGHT FIBULA FRACTURE, POSSIBLE SYNDESMOTIC LIGAMENT REPAIR -POSSIBLE BLOCK- performed by Raphael Henao MD at [facility] Bilateral     TUBAL LIGATION  2006       CURRENT MEDICATIONS       Previous Medications    BUSPIRONE (BUSPAR) 30 MG TABLET    Take 30 mg by mouth 2 times daily    IBUPROFEN (ADVIL;MOTRIN) 600 MG TABLET    Take 1 tablet by mouth every 6 hours risks of medications were discussed with the patient /family present. Strict verbal and written return precautions, instructions and appropriate follow-up provided to  the patient. ED Medications administered this visit:  (None if blank)  Medications   sodium chloride 0.9 % bolus 1,000 mL (1,000 mLs IntraVENous New Bag 8/4/23 7459)         PROCEDURES: (None if blank)  Procedures:     CRITICAL CARE: (None if blank)  5)    DISCHARGE PRESCRIPTIONS: (None if blank)  New Prescriptions    No medications on file         I am the primary clinician of record. FINAL IMPRESSION      1. Lightheadedness    2.  Hyponatremia            DISPOSITION/PLAN   DISPOSITION Discharge - Pending Orders Complete 08/04/2023 06:45:22 PM      OUTPATIENT FOLLOW UP THE PATIENT:  [facility]  [phone]            Electronically Signed: Rola Gerardo MD, 08/04/23, 6:54 PM       Rola Gerardo MD  08/04/23 3954

## 2023-08-04 NOTE — DISCHARGE INSTRUCTIONS
Continue to add salt to your diet. Follow-up with your family doctor referral you have been given on Monday schedule an appointment. Return if you have any worsening symptoms or if you develop headaches, vision changes numbness tingling or any other concerning symptoms.

## 2023-08-05 LAB
EKG ATRIAL RATE: 63 BPM
EKG DIAGNOSIS: NORMAL
EKG P AXIS: 36 DEGREES
EKG P-R INTERVAL: 158 MS
EKG Q-T INTERVAL: 392 MS
EKG QRS DURATION: 92 MS
EKG QTC CALCULATION (BAZETT): 401 MS
EKG R AXIS: 47 DEGREES
EKG T AXIS: 94 DEGREES
EKG VENTRICULAR RATE: 63 BPM

## 2023-08-05 PROCEDURE — 93010 ELECTROCARDIOGRAM REPORT: CPT | Performed by: INTERNAL MEDICINE

## 2023-08-16 ENCOUNTER — HOSPITAL ENCOUNTER (OUTPATIENT)
Age: 38
Discharge: HOME | End: 2023-08-16
Attending: INTERNAL MEDICINE
Payer: COMMERCIAL

## 2023-08-16 ENCOUNTER — HOSPITAL ENCOUNTER (OUTPATIENT)
Dept: CT IMAGING | Age: 38
Discharge: HOME | End: 2023-08-16
Attending: INTERNAL MEDICINE
Payer: COMMERCIAL

## 2023-08-16 DIAGNOSIS — R91.1 PULMONARY NODULE: ICD-10-CM

## 2023-08-16 LAB
BASOPHILS # BLD: 0 K/UL (ref 0–0.2)
BASOPHILS NFR BLD: 0.6 %
DEPRECATED RDW RBC AUTO: 16.9 % (ref 12.4–15.4)
EOSINOPHIL # BLD: 0.2 K/UL (ref 0–0.6)
EOSINOPHIL NFR BLD: 2.5 %
ERYTHROCYTE [SEDIMENTATION RATE] IN BLOOD BY WESTERGREN METHOD: 17 MM/HR (ref 0–20)
FOLATE SERPL-MCNC: 5.9 NG/ML (ref 4.78–24.2)
HCT VFR BLD AUTO: 32.2 % (ref 36–48)
HGB BLD-MCNC: 10.7 G/DL (ref 12–16)
LYMPHOCYTES # BLD: 2.4 K/UL (ref 1–5.1)
LYMPHOCYTES NFR BLD: 28.6 %
MCH RBC QN AUTO: 26.5 PG (ref 26–34)
MCHC RBC AUTO-ENTMCNC: 33.1 G/DL (ref 31–36)
MCV RBC AUTO: 80 FL (ref 80–100)
MONOCYTES # BLD: 0.5 K/UL (ref 0–1.3)
MONOCYTES NFR BLD: 6 %
NEUTROPHILS # BLD: 5.2 K/UL (ref 1.7–7.7)
NEUTROPHILS NFR BLD: 62.3 %
PLATELET # BLD AUTO: 345 K/UL (ref 135–450)
PMV BLD AUTO: 7.8 FL (ref 5–10.5)
RBC # BLD AUTO: 4.03 M/UL (ref 4–5.2)
TSH SERPL DL<=0.005 MIU/L-ACNC: 1.2 UIU/ML (ref 0.27–4.2)
VIT B12 SERPL-MCNC: 640 PG/ML (ref 211–911)
WBC # BLD AUTO: 8.4 K/UL (ref 4–11)

## 2023-08-16 PROCEDURE — 80061 LIPID PANEL: CPT

## 2023-08-16 PROCEDURE — 84443 ASSAY THYROID STIM HORMONE: CPT

## 2023-08-16 PROCEDURE — 82746 ASSAY OF FOLIC ACID SERUM: CPT

## 2023-08-16 PROCEDURE — 71250 CT THORAX DX C-: CPT

## 2023-08-16 PROCEDURE — 36415 COLL VENOUS BLD VENIPUNCTURE: CPT

## 2023-08-16 PROCEDURE — 83036 HEMOGLOBIN GLYCOSYLATED A1C: CPT

## 2023-08-16 PROCEDURE — 80053 COMPREHEN METABOLIC PANEL: CPT

## 2023-08-16 PROCEDURE — 85025 COMPLETE CBC W/AUTO DIFF WBC: CPT

## 2023-08-16 PROCEDURE — 85652 RBC SED RATE AUTOMATED: CPT

## 2023-08-16 PROCEDURE — 82607 VITAMIN B-12: CPT

## 2023-08-17 LAB
ALBUMIN SERPL-MCNC: 4.3 G/DL (ref 3.4–5)
ALBUMIN/GLOB SERPL: 1.3 {RATIO} (ref 1.1–2.2)
ALP SERPL-CCNC: 70 U/L (ref 40–129)
ALT SERPL-CCNC: 10 U/L (ref 10–40)
ANION GAP SERPL CALCULATED.3IONS-SCNC: 13 MMOL/L (ref 3–16)
AST SERPL-CCNC: 15 U/L (ref 15–37)
BILIRUB SERPL-MCNC: <0.2 MG/DL (ref 0–1)
BUN SERPL-MCNC: 16 MG/DL (ref 7–20)
CALCIUM SERPL-MCNC: 8.9 MG/DL (ref 8.3–10.6)
CHLORIDE SERPL-SCNC: 98 MMOL/L (ref 99–110)
CHOLEST SERPL-MCNC: 159 MG/DL (ref 0–199)
CO2 SERPL-SCNC: 22 MMOL/L (ref 21–32)
CREAT SERPL-MCNC: 0.8 MG/DL (ref 0.6–1.1)
EST. AVERAGE GLUCOSE BLD GHB EST-MCNC: 102.5 MG/DL
GFR SERPLBLD CREATININE-BSD FMLA CKD-EPI: >60 ML/MIN/{1.73_M2}
GLUCOSE SERPL-MCNC: 82 MG/DL (ref 70–99)
HBA1C MFR BLD: 5.2 %
HDLC SERPL-MCNC: 62 MG/DL (ref 40–60)
LDLC SERPL CALC-MCNC: 77 MG/DL
POTASSIUM SERPL-SCNC: 3.7 MMOL/L (ref 3.5–5.1)
PROT SERPL-MCNC: 7.6 G/DL (ref 6.4–8.2)
SODIUM SERPL-SCNC: 133 MMOL/L (ref 136–145)
TRIGL SERPL-MCNC: 100 MG/DL (ref 0–150)
VLDLC SERPL CALC-MCNC: 20 MG/DL

## 2023-12-06 ENCOUNTER — OFFICE VISIT (OUTPATIENT)
Age: 38
End: 2023-12-06

## 2023-12-06 VITALS
TEMPERATURE: 97.9 F | WEIGHT: 150 LBS | OXYGEN SATURATION: 95 % | BODY MASS INDEX: 25.61 KG/M2 | HEART RATE: 88 BPM | RESPIRATION RATE: 16 BRPM | HEIGHT: 64 IN | DIASTOLIC BLOOD PRESSURE: 72 MMHG | SYSTOLIC BLOOD PRESSURE: 118 MMHG

## 2023-12-06 DIAGNOSIS — J01.00 ACUTE NON-RECURRENT MAXILLARY SINUSITIS: Primary | ICD-10-CM

## 2023-12-06 LAB
INFLUENZA A ANTIBODY: NORMAL
INFLUENZA B ANTIBODY: NEGATIVE

## 2023-12-06 RX ORDER — OXCARBAZEPINE 300 MG/5ML
SUSPENSION ORAL 2 TIMES DAILY
COMMUNITY

## 2023-12-06 RX ORDER — AMOXICILLIN AND CLAVULANATE POTASSIUM 875; 125 MG/1; MG/1
1 TABLET, FILM COATED ORAL 2 TIMES DAILY
Qty: 14 TABLET | Refills: 0 | Status: SHIPPED | OUTPATIENT
Start: 2023-12-06 | End: 2023-12-13

## 2023-12-06 RX ORDER — PREDNISONE 20 MG/1
20 TABLET ORAL DAILY
Qty: 5 TABLET | Refills: 0 | Status: SHIPPED | OUTPATIENT
Start: 2023-12-06 | End: 2023-12-11

## 2023-12-06 ASSESSMENT — ENCOUNTER SYMPTOMS: COUGH: 1

## 2023-12-06 NOTE — PATIENT INSTRUCTIONS
New Prescriptions    AMOXICILLIN-CLAVULANATE (AUGMENTIN) 875-125 MG PER TABLET    Take 1 tablet by mouth 2 times daily for 7 days    PREDNISONE (DELTASONE) 20 MG TABLET    Take 1 tablet by mouth daily for 5 days    Take medications as prescribed  Tylenol/Motrin as needed for fever

## 2024-01-06 ENCOUNTER — APPOINTMENT (OUTPATIENT)
Dept: CT IMAGING | Age: 39
End: 2024-01-06
Payer: MEDICAID

## 2024-01-06 ENCOUNTER — HOSPITAL ENCOUNTER (EMERGENCY)
Age: 39
Discharge: HOME | End: 2024-01-07
Payer: MEDICAID

## 2024-01-06 DIAGNOSIS — K62.5 RECTAL BLEEDING: Primary | ICD-10-CM

## 2024-01-06 LAB
ANION GAP SERPL CALCULATED.3IONS-SCNC: 10 MMOL/L (ref 3–16)
BASOPHILS # BLD: 0.1 K/UL (ref 0–0.2)
BASOPHILS NFR BLD: 0.8 %
BUN SERPL-MCNC: 18 MG/DL (ref 7–20)
CALCIUM SERPL-MCNC: 8.8 MG/DL (ref 8.3–10.6)
CHLORIDE SERPL-SCNC: 101 MMOL/L (ref 99–110)
CO2 SERPL-SCNC: 22 MMOL/L (ref 21–32)
CREAT SERPL-MCNC: 0.8 MG/DL (ref 0.6–1.1)
DEPRECATED RDW RBC AUTO: 14.4 % (ref 12.4–15.4)
EOSINOPHIL # BLD: 0.3 K/UL (ref 0–0.6)
EOSINOPHIL NFR BLD: 4.2 %
GFR SERPLBLD CREATININE-BSD FMLA CKD-EPI: >60 ML/MIN/{1.73_M2}
GLUCOSE SERPL-MCNC: 80 MG/DL (ref 70–99)
HCG SERPL QL: NEGATIVE
HCT VFR BLD AUTO: 33.2 % (ref 36–48)
HGB BLD-MCNC: 11.1 G/DL (ref 12–16)
LYMPHOCYTES # BLD: 2.4 K/UL (ref 1–5.1)
LYMPHOCYTES NFR BLD: 29.4 %
MCH RBC QN AUTO: 27.7 PG (ref 26–34)
MCHC RBC AUTO-ENTMCNC: 33.3 G/DL (ref 31–36)
MCV RBC AUTO: 83.3 FL (ref 80–100)
MONOCYTES # BLD: 0.6 K/UL (ref 0–1.3)
MONOCYTES NFR BLD: 7.7 %
NEUTROPHILS # BLD: 4.8 K/UL (ref 1.7–7.7)
NEUTROPHILS NFR BLD: 57.9 %
PLATELET # BLD AUTO: 317 K/UL (ref 135–450)
PMV BLD AUTO: 6.7 FL (ref 5–10.5)
POTASSIUM SERPL-SCNC: 3.9 MMOL/L (ref 3.5–5.1)
RBC # BLD AUTO: 3.99 M/UL (ref 4–5.2)
SODIUM SERPL-SCNC: 133 MMOL/L (ref 136–145)
WBC # BLD AUTO: 8.2 K/UL (ref 4–11)

## 2024-01-06 PROCEDURE — 36415 COLL VENOUS BLD VENIPUNCTURE: CPT

## 2024-01-06 PROCEDURE — 80048 BASIC METABOLIC PNL TOTAL CA: CPT

## 2024-01-06 PROCEDURE — 99285 EMERGENCY DEPT VISIT HI MDM: CPT

## 2024-01-06 PROCEDURE — 6360000004 HC RX CONTRAST MEDICATION: Performed by: PHYSICIAN ASSISTANT

## 2024-01-06 PROCEDURE — 84703 CHORIONIC GONADOTROPIN ASSAY: CPT

## 2024-01-06 PROCEDURE — 74174 CTA ABD&PLVS W/CONTRAST: CPT

## 2024-01-06 PROCEDURE — 85025 COMPLETE CBC W/AUTO DIFF WBC: CPT

## 2024-01-06 RX ADMIN — IOPAMIDOL 75 ML: 755 INJECTION, SOLUTION INTRAVENOUS at 23:38

## 2024-01-06 ASSESSMENT — PAIN - FUNCTIONAL ASSESSMENT: PAIN_FUNCTIONAL_ASSESSMENT: 0-10

## 2024-01-06 ASSESSMENT — PAIN SCALES - GENERAL: PAINLEVEL_OUTOF10: 0

## 2024-01-06 ASSESSMENT — LIFESTYLE VARIABLES
HOW OFTEN DO YOU HAVE A DRINK CONTAINING ALCOHOL: NEVER
HOW MANY STANDARD DRINKS CONTAINING ALCOHOL DO YOU HAVE ON A TYPICAL DAY: PATIENT DOES NOT DRINK

## 2024-01-07 VITALS
DIASTOLIC BLOOD PRESSURE: 66 MMHG | OXYGEN SATURATION: 96 % | TEMPERATURE: 98 F | WEIGHT: 157.41 LBS | BODY MASS INDEX: 27.89 KG/M2 | SYSTOLIC BLOOD PRESSURE: 110 MMHG | RESPIRATION RATE: 15 BRPM | HEART RATE: 74 BPM | HEIGHT: 63 IN

## 2024-01-07 ASSESSMENT — PAIN - FUNCTIONAL ASSESSMENT: PAIN_FUNCTIONAL_ASSESSMENT: 0-10

## 2024-01-07 ASSESSMENT — PAIN SCALES - GENERAL: PAINLEVEL_OUTOF10: 0

## 2024-01-07 NOTE — ED PROVIDER NOTES
[facility] EMERGENCY DEPARTMENT  EMERGENCY DEPARTMENT ENCOUNTER        Pt Name: Alicia Bello  MRN: [id number]  Birthdate 1985  Date of evaluation: 1/6/2024  Provider: Marilyn Burger PA-C  PCP: Maki Romero MD  Note Started: 10:40 PM EST 1/6/24      HARSHAD. I have evaluated this patient.        CHIEF COMPLAINT       Chief Complaint   Patient presents with    Rectal Bleeding     Pt states that she has had bleeding in rectum for a couple of days now. Pt states that it got more significant today.        HISTORY OF PRESENT ILLNESS: 1 or more Elements             Alicia Bello is a 38 y.o. female who presents to the emergency department with complaint of bright red blood in the rectum for the past several days.  Today it was slightly worse.  She states that it is mostly been on the stool, however today she felt there was more in the toilet.  She is on her menstrual cycle which makes it difficult to assess.  She did do a CAMILLE which showed bright red blood.  No history of hemorrhoids.  Denies any pain such as abdominal pain, back pain, lightheadedness, dizziness.    Nursing Notes were all reviewed and agreed with or any disagreements were addressed in the HPI.    REVIEW OF SYSTEMS :      Review of Systems   All other systems reviewed and are negative.      Positives and Pertinent negatives as per HPI.     SURGICAL HISTORY     Past Surgical History:   Procedure Laterality Date    FIBULA FRACTURE SURGERY Right 10/15/2020    OPEN REDUCTION INTERNAL FIXATION RIGHT FIBULA FRACTURE, POSSIBLE SYNDESMOTIC LIGAMENT REPAIR -POSSIBLE BLOCK- performed by Lars Sevilla MD at [facility] OR    KNEE SURGERY Bilateral     TUBAL LIGATION  2006       CURRENTMEDICATIONS       Previous Medications    BUSPIRONE (BUSPAR) 30 MG TABLET    Take 30 mg by mouth 2 times daily    OXCARBAZEPINE (TRILEPTAL) 300 MG/5ML SUSPENSION    Take by mouth 2 times daily       ALLERGIES     Latex, Adhesive tape, and Zithromax

## 2024-01-19 ENCOUNTER — OFFICE VISIT (OUTPATIENT)
Age: 39
End: 2024-01-19

## 2024-01-19 VITALS
DIASTOLIC BLOOD PRESSURE: 74 MMHG | WEIGHT: 157 LBS | BODY MASS INDEX: 27.81 KG/M2 | SYSTOLIC BLOOD PRESSURE: 118 MMHG | TEMPERATURE: 96.8 F | HEART RATE: 84 BPM

## 2024-01-19 DIAGNOSIS — J01.10 ACUTE FRONTAL SINUSITIS, RECURRENCE NOT SPECIFIED: Primary | ICD-10-CM

## 2024-01-19 RX ORDER — AMOXICILLIN AND CLAVULANATE POTASSIUM 875; 125 MG/1; MG/1
1 TABLET, FILM COATED ORAL 2 TIMES DAILY
Qty: 14 TABLET | Refills: 0 | Status: SHIPPED | OUTPATIENT
Start: 2024-01-19 | End: 2024-01-26

## 2024-01-19 RX ORDER — PREDNISONE 20 MG/1
20 TABLET ORAL DAILY
Qty: 5 TABLET | Refills: 0 | Status: SHIPPED | OUTPATIENT
Start: 2024-01-19 | End: 2024-01-24

## 2024-01-19 ASSESSMENT — ENCOUNTER SYMPTOMS: SORE THROAT: 1

## 2024-01-19 NOTE — PROGRESS NOTES
Ailcia Bello (:  1985) is a 38 y.o. female,Established patient, here for evaluation of the following chief complaint(s):  No chief complaint on file.      ASSESSMENT/PLAN:    ICD-10-CM    1. Acute frontal sinusitis, recurrence not specified  J01.10 amoxicillin-clavulanate (AUGMENTIN) 875-125 MG per tablet     predniSONE (DELTASONE) 20 MG tablet          Dx Disposition: Sinusitis  Education and handout provided on diagnosis and management of symptoms.   AVS reviewed with patient. Follow up as needed in UC or with PCP for new or worsening symptoms.   Return if symptoms worsen or fail to improve.    SUBJECTIVE/OBJECTIVE:  Patient presents today with complaints of right ear pain and sore throat that started 4 days ago denies any fever or other symptoms      History provided by:  Patient   used: No        Vitals:    24 1054   BP: 118/74   Pulse: 84   Temp: 96.8 °F (36 °C)   Weight: 71.2 kg (157 lb)       Review of Systems   HENT:  Positive for ear pain and sore throat.        Physical Exam  Constitutional:       Appearance: Normal appearance.   HENT:      Head: Normocephalic.      Right Ear: Tympanic membrane is bulging.      Left Ear: Tympanic membrane is bulging.      Nose: Nose normal.      Mouth/Throat:      Mouth: Mucous membranes are moist.      Pharynx: Oropharynx is clear. Posterior oropharyngeal erythema present.   Cardiovascular:      Rate and Rhythm: Normal rate and regular rhythm.      Heart sounds: Normal heart sounds.   Pulmonary:      Effort: Pulmonary effort is normal.      Breath sounds: Normal breath sounds.   Skin:     General: Skin is warm and dry.   Neurological:      Mental Status: She is alert and oriented to person, place, and time.   Psychiatric:         Mood and Affect: Mood normal.           An electronic signature was used to authenticate this note.    --Jensen Cyr, TAB - CNP

## 2024-01-19 NOTE — PATIENT INSTRUCTIONS
Thank you for allowing us to care for you today and we hope you feel better soon  Tylenol/Motrin as needed for fever and discomfort  New Prescriptions    AMOXICILLIN-CLAVULANATE (AUGMENTIN) 875-125 MG PER TABLET    Take 1 tablet by mouth 2 times daily for 7 days    PREDNISONE (DELTASONE) 20 MG TABLET    Take 1 tablet by mouth daily for 5 days

## 2024-06-25 ENCOUNTER — OFFICE VISIT (OUTPATIENT)
Age: 39
End: 2024-06-25

## 2024-06-25 VITALS
HEART RATE: 70 BPM | SYSTOLIC BLOOD PRESSURE: 120 MMHG | OXYGEN SATURATION: 97 % | DIASTOLIC BLOOD PRESSURE: 76 MMHG | WEIGHT: 156 LBS | BODY MASS INDEX: 27.64 KG/M2 | RESPIRATION RATE: 16 BRPM | TEMPERATURE: 98.3 F | HEIGHT: 63 IN

## 2024-06-25 DIAGNOSIS — R30.0 DYSURIA: ICD-10-CM

## 2024-06-25 DIAGNOSIS — R39.15 URINARY URGENCY: ICD-10-CM

## 2024-06-25 DIAGNOSIS — N30.01 ACUTE CYSTITIS WITH HEMATURIA: Primary | ICD-10-CM

## 2024-06-25 LAB
BILIRUBIN, POC: ABNORMAL
BLOOD URINE, POC: ABNORMAL
CLARITY, POC: ABNORMAL
COLOR, POC: YELLOW
GLUCOSE URINE, POC: ABNORMAL
KETONES, POC: ABNORMAL
LEUKOCYTE EST, POC: ABNORMAL
NITRITE, POC: ABNORMAL
PH, POC: 6
PROTEIN, POC: ABNORMAL
SPECIFIC GRAVITY, POC: 1.02
UROBILINOGEN, POC: 0.2

## 2024-06-25 RX ORDER — NITROFURANTOIN 25; 75 MG/1; MG/1
100 CAPSULE ORAL 2 TIMES DAILY
Qty: 10 CAPSULE | Refills: 0 | Status: SHIPPED | OUTPATIENT
Start: 2024-06-25 | End: 2024-06-30

## 2024-06-25 RX ORDER — GLECAPREVIR AND PIBRENTASVIR 40; 100 MG/1; MG/1
TABLET, FILM COATED ORAL
COMMUNITY

## 2024-06-25 RX ORDER — IRON POLYSACCHARIDE COMPLEX 180 MG
CAPSULE ORAL
COMMUNITY
Start: 2024-03-26

## 2024-06-25 ASSESSMENT — ENCOUNTER SYMPTOMS
NAUSEA: 0
CONSTIPATION: 0
CHEST TIGHTNESS: 0
SHORTNESS OF BREATH: 0
COUGH: 0
VOMITING: 0
DIARRHEA: 0
ABDOMINAL PAIN: 0

## 2024-06-25 NOTE — PROGRESS NOTES
Alicia Bello (:  1985) is a 39 y.o. female,Established patient, here for evaluation of the following chief complaint(s):  Urinary Tract Infection (Pt c/o urinary urgency, frequency, dysuria and cloudy urine x 3 days)      ASSESSMENT/PLAN:    ICD-10-CM    1. Acute cystitis with hematuria  N30.01 nitrofurantoin, macrocrystal-monohydrate, (MACROBID) 100 MG capsule      2. Dysuria  R30.0 POCT Urinalysis no Micro     nitrofurantoin, macrocrystal-monohydrate, (MACROBID) 100 MG capsule     CANCELED: Culture, Urine      3. Urinary urgency  R39.15 nitrofurantoin, macrocrystal-monohydrate, (MACROBID) 100 MG capsule     CANCELED: Culture, Urine          Patient presents for dysuria and cloudy urine.  DDX: UTI vs BV vs yeast infection  UA: small leukocytes  Given history will tx for UTI  Please follow up if symptoms persist.   SUBJECTIVE/OBJECTIVE:    History provided by:  Patient   used: No      HPI:   39 y.o. female presents with symptoms of urinary urgency, dysuria ongoing since three days.  Dysuria really worsened yesterday. NO hematuria. She does Have a history of UTI and this does feel similar in nature. Has had tubal ligation. Has not taken medications for symptoms.      Vitals:    24 1524   BP: 120/76   Pulse: 70   Resp: 16   Temp: 98.3 °F (36.8 °C)   SpO2: 97%   Weight: 70.8 kg (156 lb)   Height: 1.6 m (5' 3\")       Review of Systems   Constitutional:  Negative for fatigue and fever.   Respiratory:  Negative for cough, chest tightness and shortness of breath.    Cardiovascular:  Negative for chest pain.   Gastrointestinal:  Negative for abdominal pain, constipation, diarrhea, nausea and vomiting.   Genitourinary:  Positive for dysuria and frequency. Negative for flank pain and hematuria.       Physical Exam  Vitals and nursing note reviewed.   Constitutional:       Appearance: Normal appearance.   HENT:      Head: Normocephalic and atraumatic.   Eyes:      Extraocular Movements:

## (undated) DEVICE — SCREW BNE L20MM DIA3.5MM CORT ANK S STL NONLOCKING LO PROF: Type: IMPLANTABLE DEVICE | Site: ANKLE | Status: NON-FUNCTIONAL

## (undated) DEVICE — SUTURE MCRYL SZ 4-0 L18IN ABSRB UD L19MM PS-2 3/8 CIR PRIM Y496G

## (undated) DEVICE — SUTURE VCRL SZ 2-0 L18IN ABSRB UD CT-1 L36MM 1/2 CIR J839D

## (undated) DEVICE — ZIMMER® STERILE DISPOSABLE TOURNIQUET CUFF WITH PLC, DUAL PORT, SINGLE BLADDER, 30 IN. (76 CM)

## (undated) DEVICE — CATHETER IV 20GA L1.25IN PNK FEP SFTY STR HUB RADPQ DISP

## (undated) DEVICE — ZIP 8I SURGICAL SKIN CLOSURE DEVICE: Brand: ZIP 8I SURGICAL SKIN CLOSURE DEVICE

## (undated) DEVICE — PADDING UNDERCAST W6INXL4YD WYTEX 6 PER BG

## (undated) DEVICE — PADDING CAST W4INXL4YD NONSTERILE COT RAYON MICROPLEATED

## (undated) DEVICE — 3M™ TEGADERM™ TRANSPARENT FILM DRESSING FRAME STYLE, 1624W, 2-3/8 IN X 2-3/4 IN (6 CM X 7 CM), 100/CT 4CT/CASE: Brand: 3M™ TEGADERM™

## (undated) DEVICE — INTENDED FOR TISSUE SEPARATION, AND OTHER PROCEDURES THAT REQUIRE A SHARP SURGICAL BLADE TO PUNCTURE OR CUT.: Brand: BARD-PARKER ® STAINLESS STEEL BLADES

## (undated) DEVICE — GLOVE SURG SZ 8 L12IN FNGR THK94MIL STD WHT LTX FREE

## (undated) DEVICE — BIT DRL DIA2MM CALIB FOR ANK FRAC MGMT SYS

## (undated) DEVICE — SOLUTION IV 1000ML LAC RINGERS PH 6.5 INJ USP VIAFLX PLAS

## (undated) DEVICE — SUTURE VCRL SZ 3-0 L18IN ABSRB UD L26MM SH 1/2 CIR J864D

## (undated) DEVICE — SET GRAV VENT NVENT CK VLV 3 NDL FREE PRT 10 GTT

## (undated) DEVICE — BIT DRL DIA3.5MM TRIM-IT

## (undated) DEVICE — SET ADMIN PRIMING 7ML L30IN 7.35LB 20 GTT 2ND RLER CLMP

## (undated) DEVICE — PAD,NON-ADHERENT,3X8,STERILE,LF,1/PK: Brand: MEDLINE

## (undated) DEVICE — PACK EXTREMITY XR

## (undated) DEVICE — BIT DRL DIA2.5MM LNG GRAD FOR ANK FRAC MGMT SYS

## (undated) DEVICE — BIT DRL DIA2.5MM FOR ANK FRAC MGMT SYS

## (undated) DEVICE — GUIDEWIRE ORTH L150MM DIA0.053IN W/ TRCR TIP FOR ANK FRAC

## (undated) DEVICE — SOLUTION IV IRRIG 500ML 0.9% SODIUM CHL 2F7123

## (undated) DEVICE — SPLINT ORTH W4XL30IN LAYERED FBRGLS FOAM PD BRTH BK MOLD

## (undated) DEVICE — DRAPE EQUIP C ARM MINI 10000100] TIDI PRODUCTS INC]